# Patient Record
Sex: FEMALE | Race: WHITE | NOT HISPANIC OR LATINO
[De-identification: names, ages, dates, MRNs, and addresses within clinical notes are randomized per-mention and may not be internally consistent; named-entity substitution may affect disease eponyms.]

---

## 2017-09-30 ENCOUNTER — APPOINTMENT (OUTPATIENT)
Dept: ULTRASOUND IMAGING | Facility: CLINIC | Age: 42
End: 2017-09-30
Payer: COMMERCIAL

## 2017-09-30 ENCOUNTER — OUTPATIENT (OUTPATIENT)
Dept: OUTPATIENT SERVICES | Facility: HOSPITAL | Age: 42
LOS: 1 days | End: 2017-09-30
Payer: COMMERCIAL

## 2017-09-30 DIAGNOSIS — K82.8 OTHER SPECIFIED DISEASES OF GALLBLADDER: ICD-10-CM

## 2017-09-30 PROCEDURE — 76700 US EXAM ABDOM COMPLETE: CPT

## 2017-09-30 PROCEDURE — 76700 US EXAM ABDOM COMPLETE: CPT | Mod: 26

## 2017-10-09 ENCOUNTER — APPOINTMENT (OUTPATIENT)
Dept: OBGYN | Facility: CLINIC | Age: 42
End: 2017-10-09
Payer: COMMERCIAL

## 2017-10-09 PROCEDURE — 99396 PREV VISIT EST AGE 40-64: CPT

## 2017-12-28 ENCOUNTER — APPOINTMENT (OUTPATIENT)
Dept: ULTRASOUND IMAGING | Facility: CLINIC | Age: 42
End: 2017-12-28

## 2017-12-28 ENCOUNTER — OUTPATIENT (OUTPATIENT)
Dept: OUTPATIENT SERVICES | Facility: HOSPITAL | Age: 42
LOS: 1 days | End: 2017-12-28
Payer: COMMERCIAL

## 2017-12-28 ENCOUNTER — APPOINTMENT (OUTPATIENT)
Dept: MAMMOGRAPHY | Facility: CLINIC | Age: 42
End: 2017-12-28

## 2017-12-28 DIAGNOSIS — Z00.8 ENCOUNTER FOR OTHER GENERAL EXAMINATION: ICD-10-CM

## 2017-12-28 PROCEDURE — G0202: CPT | Mod: 26

## 2017-12-28 PROCEDURE — 77063 BREAST TOMOSYNTHESIS BI: CPT | Mod: 26

## 2017-12-28 PROCEDURE — 77067 SCR MAMMO BI INCL CAD: CPT

## 2017-12-28 PROCEDURE — 77063 BREAST TOMOSYNTHESIS BI: CPT

## 2018-05-01 ENCOUNTER — EMERGENCY (EMERGENCY)
Facility: HOSPITAL | Age: 43
LOS: 1 days | Discharge: HOME | End: 2018-05-01
Attending: EMERGENCY MEDICINE | Admitting: EMERGENCY MEDICINE
Payer: COMMERCIAL

## 2018-05-01 VITALS
DIASTOLIC BLOOD PRESSURE: 64 MMHG | RESPIRATION RATE: 16 BRPM | SYSTOLIC BLOOD PRESSURE: 119 MMHG | HEART RATE: 75 BPM | OXYGEN SATURATION: 100 % | TEMPERATURE: 99 F

## 2018-05-01 VITALS
OXYGEN SATURATION: 99 % | HEART RATE: 87 BPM | TEMPERATURE: 100 F | SYSTOLIC BLOOD PRESSURE: 112 MMHG | RESPIRATION RATE: 17 BRPM | DIASTOLIC BLOOD PRESSURE: 73 MMHG

## 2018-05-01 LAB
ALBUMIN SERPL ELPH-MCNC: 4.5 G/DL — SIGNIFICANT CHANGE UP (ref 3.3–5)
ALP SERPL-CCNC: 41 U/L — SIGNIFICANT CHANGE UP (ref 40–120)
ALT FLD-CCNC: 15 U/L — SIGNIFICANT CHANGE UP (ref 10–45)
ANION GAP SERPL CALC-SCNC: 12 MMOL/L — SIGNIFICANT CHANGE UP (ref 5–17)
APPEARANCE UR: CLEAR — SIGNIFICANT CHANGE UP
AST SERPL-CCNC: 20 U/L — SIGNIFICANT CHANGE UP (ref 10–40)
BACTERIA # UR AUTO: ABNORMAL /HPF
BASOPHILS # BLD AUTO: 0 K/UL — SIGNIFICANT CHANGE UP (ref 0–0.2)
BASOPHILS NFR BLD AUTO: 0.4 % — SIGNIFICANT CHANGE UP (ref 0–2)
BILIRUB SERPL-MCNC: 0.5 MG/DL — SIGNIFICANT CHANGE UP (ref 0.2–1.2)
BILIRUB UR-MCNC: NEGATIVE — SIGNIFICANT CHANGE UP
BUN SERPL-MCNC: 14 MG/DL — SIGNIFICANT CHANGE UP (ref 7–23)
CALCIUM SERPL-MCNC: 9.8 MG/DL — SIGNIFICANT CHANGE UP (ref 8.4–10.5)
CHLORIDE SERPL-SCNC: 102 MMOL/L — SIGNIFICANT CHANGE UP (ref 96–108)
CO2 SERPL-SCNC: 26 MMOL/L — SIGNIFICANT CHANGE UP (ref 22–31)
COLOR SPEC: SIGNIFICANT CHANGE UP
CREAT SERPL-MCNC: 0.9 MG/DL — SIGNIFICANT CHANGE UP (ref 0.5–1.3)
DIFF PNL FLD: NEGATIVE — SIGNIFICANT CHANGE UP
EOSINOPHIL # BLD AUTO: 0.1 K/UL — SIGNIFICANT CHANGE UP (ref 0–0.5)
EOSINOPHIL NFR BLD AUTO: 0.7 % — SIGNIFICANT CHANGE UP (ref 0–6)
EPI CELLS # UR: SIGNIFICANT CHANGE UP /HPF
GLUCOSE SERPL-MCNC: 91 MG/DL — SIGNIFICANT CHANGE UP (ref 70–99)
GLUCOSE UR QL: NEGATIVE — SIGNIFICANT CHANGE UP
HCT VFR BLD CALC: 38.9 % — SIGNIFICANT CHANGE UP (ref 34.5–45)
HGB BLD-MCNC: 13.7 G/DL — SIGNIFICANT CHANGE UP (ref 11.5–15.5)
HIV 1 & 2 AB SERPL IA.RAPID: SIGNIFICANT CHANGE UP
KETONES UR-MCNC: NEGATIVE — SIGNIFICANT CHANGE UP
LEUKOCYTE ESTERASE UR-ACNC: ABNORMAL
LYMPHOCYTES # BLD AUTO: 1.9 K/UL — SIGNIFICANT CHANGE UP (ref 1–3.3)
LYMPHOCYTES # BLD AUTO: 19.8 % — SIGNIFICANT CHANGE UP (ref 13–44)
MCHC RBC-ENTMCNC: 32.3 PG — SIGNIFICANT CHANGE UP (ref 27–34)
MCHC RBC-ENTMCNC: 35.1 GM/DL — SIGNIFICANT CHANGE UP (ref 32–36)
MCV RBC AUTO: 91.9 FL — SIGNIFICANT CHANGE UP (ref 80–100)
MONOCYTES # BLD AUTO: 0.6 K/UL — SIGNIFICANT CHANGE UP (ref 0–0.9)
MONOCYTES NFR BLD AUTO: 6.7 % — SIGNIFICANT CHANGE UP (ref 2–14)
NEUTROPHILS # BLD AUTO: 6.8 K/UL — SIGNIFICANT CHANGE UP (ref 1.8–7.4)
NEUTROPHILS NFR BLD AUTO: 72.4 % — SIGNIFICANT CHANGE UP (ref 43–77)
NITRITE UR-MCNC: NEGATIVE — SIGNIFICANT CHANGE UP
PH UR: 7 — SIGNIFICANT CHANGE UP (ref 5–8)
PLATELET # BLD AUTO: 166 K/UL — SIGNIFICANT CHANGE UP (ref 150–400)
POTASSIUM SERPL-MCNC: 3.8 MMOL/L — SIGNIFICANT CHANGE UP (ref 3.5–5.3)
POTASSIUM SERPL-SCNC: 3.8 MMOL/L — SIGNIFICANT CHANGE UP (ref 3.5–5.3)
PROT SERPL-MCNC: 7.5 G/DL — SIGNIFICANT CHANGE UP (ref 6–8.3)
PROT UR-MCNC: NEGATIVE — SIGNIFICANT CHANGE UP
RBC # BLD: 4.24 M/UL — SIGNIFICANT CHANGE UP (ref 3.8–5.2)
RBC # FLD: 11.3 % — SIGNIFICANT CHANGE UP (ref 10.3–14.5)
SODIUM SERPL-SCNC: 140 MMOL/L — SIGNIFICANT CHANGE UP (ref 135–145)
SP GR SPEC: 1.01 — SIGNIFICANT CHANGE UP (ref 1.01–1.02)
UROBILINOGEN FLD QL: NEGATIVE — SIGNIFICANT CHANGE UP
WBC # BLD: 9.4 K/UL — SIGNIFICANT CHANGE UP (ref 3.8–10.5)
WBC # FLD AUTO: 9.4 K/UL — SIGNIFICANT CHANGE UP (ref 3.8–10.5)
WBC UR QL: SIGNIFICANT CHANGE UP /HPF (ref 0–5)

## 2018-05-01 PROCEDURE — 86703 HIV-1/HIV-2 1 RESULT ANTBDY: CPT

## 2018-05-01 PROCEDURE — 93005 ELECTROCARDIOGRAM TRACING: CPT

## 2018-05-01 PROCEDURE — 93010 ELECTROCARDIOGRAM REPORT: CPT

## 2018-05-01 PROCEDURE — 80053 COMPREHEN METABOLIC PANEL: CPT

## 2018-05-01 PROCEDURE — 99283 EMERGENCY DEPT VISIT LOW MDM: CPT

## 2018-05-01 PROCEDURE — 99284 EMERGENCY DEPT VISIT MOD MDM: CPT | Mod: 25

## 2018-05-01 PROCEDURE — 85027 COMPLETE CBC AUTOMATED: CPT

## 2018-05-01 PROCEDURE — 81001 URINALYSIS AUTO W/SCOPE: CPT

## 2018-05-01 RX ORDER — METOCLOPRAMIDE HCL 10 MG
1 TABLET ORAL
Qty: 18 | Refills: 0 | OUTPATIENT
Start: 2018-05-01 | End: 2018-05-03

## 2018-05-01 NOTE — ED PROVIDER NOTE - MEDICAL DECISION MAKING DETAILS
Episodic vertigo, worse with head movement, walking appears consistent with benign positional vertigo.  Low suspicion for CVA as vertigo has now resolved and neuro exam is benign.  Unclear how much mitral valve prolapse increases risk of CVA.  Plan for CBC, CMP, UA.  May consider head imaging. Episodic vertigo, worse with head movement, walking appears consistent with benign positional vertigo.  Low suspicion for CVA as vertigo has now resolved and neuro exam is benign and mitral valve does not increase stroke risk.  Plan for CBC, CMP, EKG.  Will check UA given urinary frequency.  Likely d/c to home with PRN meds.

## 2018-05-01 NOTE — ED ADULT NURSE NOTE - OBJECTIVE STATEMENT
41 y/o female A&Ox4, PMH mitral valve prolapse, presents to ED sent from Urgent Care center for r/o mini stroke, vertigo symptoms. Pt states she was eating lunch at work when she became dizzy and lightheaded, states she "felt a jolt and the room spinning." Pt states she finished her work day, went to Urgent Care where they recommended pt come to ED for further evaluation to r/o stroke. Upon further assessment, airway patent and intact, denies chest pain/SOB. ABD soft nontender, denies n/v/d. Skin intact. Peripheral pulses strong and normal baseline sensation present x4. Safety and comfort measures maintained.

## 2018-05-01 NOTE — ED PROVIDER NOTE - NEUROLOGICAL, MLM
Alert and oriented, no focal deficits, no motor or sensory deficits.  No gait instability or nystagmus with positional change.

## 2018-05-01 NOTE — ED PROVIDER NOTE - OBJECTIVE STATEMENT
42F hx mitral valve prolapse presenting with episodes of dizziness.  Pt reports an episode of 'room spinning' and feel as if she was going to fall down earlier today at work.  The episode self-resolved but reoccurred multiple times later on today especially when she was walking or turning her head. 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  She follows with a cardiologist for mitral valve prolapse, has not had any valve replacement.  She denies fever., chills, headache, visual changes, focal weakness. After having the initial episode, she report some transient upper extremity paresthesias but reports that she had been researching symptoms of stroke on the internet.   Pt also endorsing some urinary frequency but has been drinking a lot of water today to 'prevent dehydration'.  She was advised to go to urgent care or ER by school RN (pt works as HS teacher) and she went to [facility] where she was advised to go to ER for stroke evaluation. 42F hx mitral valve prolapse presenting with episodes of dizziness.  Pt reports an episode of 'room spinning' and feel as if she was going to fall down earlier today at work.  The episode self-resolved but reoccurred multiple times later on today especially when she was walking or turning her head. 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  She follows with a cardiologist for mitral valve prolapse, has not had any valve replacement.  She denies fever, chills, headache, visual changes, focal weakness. After having the initial episode, she report some transient upper extremity paresthesias but reports that she had been researching symptoms of stroke on the internet.   Pt also endorsing some urinary frequency but has been drinking a lot of water today to 'prevent dehydration'.  She was advised to go to urgent care or ER by school RN (pt works as HS teacher) and she went to [facility] where she was advised to go to ER for stroke evaluation. 42F hx mitral valve prolapse presenting with episodes of dizziness.  Pt reports an episode of 'room spinning' and feel as if she was going to fall down earlier today at work.  The episode self-resolved but reoccurred multiple times later on today especially when she was walking or turning her head. 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  She follows with a cardiologist for mitral valve prolapse and has not had any valve replacement.  She denies fever, chills, headache, visual changes, focal weakness. After having the initial episode, she report some transient upper extremity paresthesias but reports that she had been researching symptoms of stroke on the internet.   Pt also endorsing some urinary frequency but has been drinking a lot of water today to 'prevent dehydration'.  She was advised to go to urgent care or ER by school RN (pt works as HS teacher) and she went to [facility] where she was advised to go to ER for stroke evaluation.

## 2018-05-01 NOTE — ED PROVIDER NOTE - ATTENDING CONTRIBUTION TO CARE
I have seen and evaluated this patient with the resident.   I agree with the findings  unless other wise stated.  I have made appropriate changes in documentations where needed, After my face to face bedside evaluation, I am further  noting:  Pt with episodic vertigo, worse with head movement, walking appears consistent with benign positional vertigo. No risk factirs for CVA  no head trauma  Low suspicion for CVA as vertigo has now resolved and neuro exam is benign and mitral valve does not increase stroke risk.  Plan for CBC, CMP, EKG all reviewed wnl .  Will check UA given urinary frequency.  Likely d/c to home with  Reglan for  needs -- [place]

## 2019-07-19 PROBLEM — I34.1 NONRHEUMATIC MITRAL (VALVE) PROLAPSE: Chronic | Status: ACTIVE | Noted: 2018-05-01

## 2019-07-22 ENCOUNTER — RESULT REVIEW (OUTPATIENT)
Age: 44
End: 2019-07-22

## 2019-07-22 ENCOUNTER — APPOINTMENT (OUTPATIENT)
Dept: OBGYN | Facility: CLINIC | Age: 44
End: 2019-07-22
Payer: COMMERCIAL

## 2019-07-22 PROCEDURE — 99396 PREV VISIT EST AGE 40-64: CPT

## 2019-07-22 PROCEDURE — 99213 OFFICE O/P EST LOW 20 MIN: CPT | Mod: 25

## 2019-08-13 ENCOUNTER — OUTPATIENT (OUTPATIENT)
Dept: OUTPATIENT SERVICES | Facility: HOSPITAL | Age: 44
LOS: 1 days | End: 2019-08-13
Payer: COMMERCIAL

## 2019-08-13 ENCOUNTER — APPOINTMENT (OUTPATIENT)
Dept: MAMMOGRAPHY | Facility: CLINIC | Age: 44
End: 2019-08-13
Payer: COMMERCIAL

## 2019-08-13 DIAGNOSIS — Z00.8 ENCOUNTER FOR OTHER GENERAL EXAMINATION: ICD-10-CM

## 2019-08-13 PROCEDURE — 77063 BREAST TOMOSYNTHESIS BI: CPT

## 2019-08-13 PROCEDURE — 77067 SCR MAMMO BI INCL CAD: CPT

## 2019-08-13 PROCEDURE — 77063 BREAST TOMOSYNTHESIS BI: CPT | Mod: 26

## 2019-08-13 PROCEDURE — 77067 SCR MAMMO BI INCL CAD: CPT | Mod: 26

## 2019-08-14 ENCOUNTER — APPOINTMENT (OUTPATIENT)
Dept: OBGYN | Facility: CLINIC | Age: 44
End: 2019-08-14
Payer: COMMERCIAL

## 2019-08-14 PROCEDURE — 99213 OFFICE O/P EST LOW 20 MIN: CPT

## 2019-09-18 ENCOUNTER — APPOINTMENT (OUTPATIENT)
Dept: OBGYN | Facility: CLINIC | Age: 44
End: 2019-09-18
Payer: COMMERCIAL

## 2019-09-18 PROCEDURE — 99213 OFFICE O/P EST LOW 20 MIN: CPT

## 2019-10-09 ENCOUNTER — OUTPATIENT (OUTPATIENT)
Dept: OUTPATIENT SERVICES | Facility: HOSPITAL | Age: 44
LOS: 1 days | End: 2019-10-09
Payer: COMMERCIAL

## 2019-10-09 ENCOUNTER — APPOINTMENT (OUTPATIENT)
Dept: ULTRASOUND IMAGING | Facility: CLINIC | Age: 44
End: 2019-10-09
Payer: COMMERCIAL

## 2019-10-09 DIAGNOSIS — Z00.8 ENCOUNTER FOR OTHER GENERAL EXAMINATION: ICD-10-CM

## 2019-10-09 PROCEDURE — 76641 ULTRASOUND BREAST COMPLETE: CPT | Mod: 26,50

## 2019-10-09 PROCEDURE — 76856 US EXAM PELVIC COMPLETE: CPT | Mod: 26,59

## 2019-10-09 PROCEDURE — 76856 US EXAM PELVIC COMPLETE: CPT

## 2019-10-09 PROCEDURE — 76830 TRANSVAGINAL US NON-OB: CPT | Mod: 26

## 2019-10-09 PROCEDURE — 76830 TRANSVAGINAL US NON-OB: CPT

## 2019-10-09 PROCEDURE — 76641 ULTRASOUND BREAST COMPLETE: CPT

## 2019-10-11 ENCOUNTER — APPOINTMENT (OUTPATIENT)
Dept: ULTRASOUND IMAGING | Facility: CLINIC | Age: 44
End: 2019-10-11
Payer: COMMERCIAL

## 2019-10-11 ENCOUNTER — RESULT REVIEW (OUTPATIENT)
Age: 44
End: 2019-10-11

## 2019-10-11 ENCOUNTER — OUTPATIENT (OUTPATIENT)
Dept: OUTPATIENT SERVICES | Facility: HOSPITAL | Age: 44
LOS: 1 days | End: 2019-10-11
Payer: COMMERCIAL

## 2019-10-11 DIAGNOSIS — Z00.8 ENCOUNTER FOR OTHER GENERAL EXAMINATION: ICD-10-CM

## 2019-10-11 PROCEDURE — 88305 TISSUE EXAM BY PATHOLOGIST: CPT | Mod: 26

## 2019-10-11 PROCEDURE — 77065 DX MAMMO INCL CAD UNI: CPT | Mod: 26,LT

## 2019-10-11 PROCEDURE — 88305 TISSUE EXAM BY PATHOLOGIST: CPT

## 2019-10-11 PROCEDURE — A4648: CPT

## 2019-10-11 PROCEDURE — 77065 DX MAMMO INCL CAD UNI: CPT

## 2019-10-11 PROCEDURE — 19083 BX BREAST 1ST LESION US IMAG: CPT | Mod: LT

## 2019-10-11 PROCEDURE — 19083 BX BREAST 1ST LESION US IMAG: CPT

## 2021-03-09 ENCOUNTER — OUTPATIENT (OUTPATIENT)
Dept: OUTPATIENT SERVICES | Facility: HOSPITAL | Age: 46
LOS: 1 days | End: 2021-03-09
Payer: COMMERCIAL

## 2021-03-09 ENCOUNTER — APPOINTMENT (OUTPATIENT)
Dept: MAMMOGRAPHY | Facility: CLINIC | Age: 46
End: 2021-03-09
Payer: COMMERCIAL

## 2021-03-09 ENCOUNTER — APPOINTMENT (OUTPATIENT)
Dept: ULTRASOUND IMAGING | Facility: CLINIC | Age: 46
End: 2021-03-09
Payer: COMMERCIAL

## 2021-03-09 DIAGNOSIS — Z00.00 ENCOUNTER FOR GENERAL ADULT MEDICAL EXAMINATION WITHOUT ABNORMAL FINDINGS: ICD-10-CM

## 2021-03-09 DIAGNOSIS — N60.19 DIFFUSE CYSTIC MASTOPATHY OF UNSPECIFIED BREAST: ICD-10-CM

## 2021-03-09 DIAGNOSIS — Z12.39 ENCOUNTER FOR OTHER SCREENING FOR MALIGNANT NEOPLASM OF BREAST: ICD-10-CM

## 2021-03-09 DIAGNOSIS — Z00.8 ENCOUNTER FOR OTHER GENERAL EXAMINATION: ICD-10-CM

## 2021-03-09 PROCEDURE — 77067 SCR MAMMO BI INCL CAD: CPT

## 2021-03-09 PROCEDURE — 77067 SCR MAMMO BI INCL CAD: CPT | Mod: 26

## 2021-03-09 PROCEDURE — 77063 BREAST TOMOSYNTHESIS BI: CPT | Mod: 26

## 2021-03-09 PROCEDURE — 77063 BREAST TOMOSYNTHESIS BI: CPT

## 2021-03-09 PROCEDURE — 76641 ULTRASOUND BREAST COMPLETE: CPT

## 2021-03-09 PROCEDURE — 76641 ULTRASOUND BREAST COMPLETE: CPT | Mod: 26,50

## 2021-08-21 NOTE — ED ADULT NURSE NOTE - TEMPLATE LIST FOR HEAD TO TOE ASSESSMENT
Neuro
Alert-The patient is alert, awake and responds to voice. The patient is oriented to time, place, and person. The triage nurse is able to obtain subjective information.

## 2022-06-10 ENCOUNTER — APPOINTMENT (OUTPATIENT)
Dept: ULTRASOUND IMAGING | Facility: CLINIC | Age: 47
End: 2022-06-10
Payer: COMMERCIAL

## 2022-06-10 ENCOUNTER — APPOINTMENT (OUTPATIENT)
Dept: MAMMOGRAPHY | Facility: CLINIC | Age: 47
End: 2022-06-10
Payer: COMMERCIAL

## 2022-06-10 ENCOUNTER — OUTPATIENT (OUTPATIENT)
Dept: OUTPATIENT SERVICES | Facility: HOSPITAL | Age: 47
LOS: 1 days | End: 2022-06-10
Payer: COMMERCIAL

## 2022-06-10 DIAGNOSIS — Z00.8 ENCOUNTER FOR OTHER GENERAL EXAMINATION: ICD-10-CM

## 2022-06-10 PROCEDURE — 77067 SCR MAMMO BI INCL CAD: CPT | Mod: 26

## 2022-06-10 PROCEDURE — 77063 BREAST TOMOSYNTHESIS BI: CPT | Mod: 26

## 2022-06-10 PROCEDURE — 76641 ULTRASOUND BREAST COMPLETE: CPT | Mod: 26,50

## 2022-06-10 PROCEDURE — 77067 SCR MAMMO BI INCL CAD: CPT

## 2022-06-10 PROCEDURE — 77063 BREAST TOMOSYNTHESIS BI: CPT

## 2022-06-10 PROCEDURE — 76641 ULTRASOUND BREAST COMPLETE: CPT

## 2023-01-06 ENCOUNTER — EMERGENCY (EMERGENCY)
Facility: HOSPITAL | Age: 48
LOS: 1 days | Discharge: HOME | End: 2023-01-06
Attending: EMERGENCY MEDICINE | Admitting: EMERGENCY MEDICINE
Payer: COMMERCIAL

## 2023-01-06 VITALS
HEART RATE: 83 BPM | TEMPERATURE: 98 F | DIASTOLIC BLOOD PRESSURE: 79 MMHG | OXYGEN SATURATION: 100 % | SYSTOLIC BLOOD PRESSURE: 128 MMHG | RESPIRATION RATE: 18 BRPM

## 2023-01-06 LAB
ALBUMIN SERPL ELPH-MCNC: 5.3 G/DL — HIGH (ref 3.3–5)
ALP SERPL-CCNC: 54 U/L — SIGNIFICANT CHANGE UP (ref 40–120)
ALT FLD-CCNC: 14 U/L — SIGNIFICANT CHANGE UP (ref 4–33)
ANION GAP SERPL CALC-SCNC: 11 MMOL/L — SIGNIFICANT CHANGE UP (ref 7–14)
APPEARANCE UR: CLEAR — SIGNIFICANT CHANGE UP
AST SERPL-CCNC: 19 U/L — SIGNIFICANT CHANGE UP (ref 4–32)
BASOPHILS # BLD AUTO: 0.03 K/UL — SIGNIFICANT CHANGE UP (ref 0–0.2)
BASOPHILS NFR BLD AUTO: 0.3 % — SIGNIFICANT CHANGE UP (ref 0–2)
BILIRUB SERPL-MCNC: 0.7 MG/DL — SIGNIFICANT CHANGE UP (ref 0.2–1.2)
BILIRUB UR-MCNC: NEGATIVE — SIGNIFICANT CHANGE UP
BLD GP AB SCN SERPL QL: NEGATIVE — SIGNIFICANT CHANGE UP
BUN SERPL-MCNC: 9 MG/DL — SIGNIFICANT CHANGE UP (ref 7–23)
CALCIUM SERPL-MCNC: 9.6 MG/DL — SIGNIFICANT CHANGE UP (ref 8.4–10.5)
CHLORIDE SERPL-SCNC: 101 MMOL/L — SIGNIFICANT CHANGE UP (ref 98–107)
CO2 SERPL-SCNC: 27 MMOL/L — SIGNIFICANT CHANGE UP (ref 22–31)
COLOR SPEC: COLORLESS — SIGNIFICANT CHANGE UP
CREAT SERPL-MCNC: 0.77 MG/DL — SIGNIFICANT CHANGE UP (ref 0.5–1.3)
DIFF PNL FLD: NEGATIVE — SIGNIFICANT CHANGE UP
EGFR: 96 ML/MIN/1.73M2 — SIGNIFICANT CHANGE UP
EOSINOPHIL # BLD AUTO: 0.04 K/UL — SIGNIFICANT CHANGE UP (ref 0–0.5)
EOSINOPHIL NFR BLD AUTO: 0.4 % — SIGNIFICANT CHANGE UP (ref 0–6)
FLUAV AG NPH QL: SIGNIFICANT CHANGE UP
FLUBV AG NPH QL: SIGNIFICANT CHANGE UP
GLUCOSE SERPL-MCNC: 91 MG/DL — SIGNIFICANT CHANGE UP (ref 70–99)
GLUCOSE UR QL: NEGATIVE — SIGNIFICANT CHANGE UP
HCG SERPL-ACNC: <5 MIU/ML — SIGNIFICANT CHANGE UP
HCT VFR BLD CALC: 40.5 % — SIGNIFICANT CHANGE UP (ref 34.5–45)
HGB BLD-MCNC: 13.4 G/DL — SIGNIFICANT CHANGE UP (ref 11.5–15.5)
IANC: 8.1 K/UL — HIGH (ref 1.8–7.4)
IMM GRANULOCYTES NFR BLD AUTO: 0.3 % — SIGNIFICANT CHANGE UP (ref 0–0.9)
KETONES UR-MCNC: NEGATIVE — SIGNIFICANT CHANGE UP
LEUKOCYTE ESTERASE UR-ACNC: NEGATIVE — SIGNIFICANT CHANGE UP
LYMPHOCYTES # BLD AUTO: 18.5 % — SIGNIFICANT CHANGE UP (ref 13–44)
LYMPHOCYTES # BLD AUTO: 2.02 K/UL — SIGNIFICANT CHANGE UP (ref 1–3.3)
MCHC RBC-ENTMCNC: 29.8 PG — SIGNIFICANT CHANGE UP (ref 27–34)
MCHC RBC-ENTMCNC: 33.1 GM/DL — SIGNIFICANT CHANGE UP (ref 32–36)
MCV RBC AUTO: 90 FL — SIGNIFICANT CHANGE UP (ref 80–100)
MONOCYTES # BLD AUTO: 0.67 K/UL — SIGNIFICANT CHANGE UP (ref 0–0.9)
MONOCYTES NFR BLD AUTO: 6.2 % — SIGNIFICANT CHANGE UP (ref 2–14)
NEUTROPHILS # BLD AUTO: 8.1 K/UL — HIGH (ref 1.8–7.4)
NEUTROPHILS NFR BLD AUTO: 74.3 % — SIGNIFICANT CHANGE UP (ref 43–77)
NITRITE UR-MCNC: NEGATIVE — SIGNIFICANT CHANGE UP
NRBC # BLD: 0 /100 WBCS — SIGNIFICANT CHANGE UP (ref 0–0)
NRBC # FLD: 0 K/UL — SIGNIFICANT CHANGE UP (ref 0–0)
PH UR: 6.5 — SIGNIFICANT CHANGE UP (ref 5–8)
PLATELET # BLD AUTO: 208 K/UL — SIGNIFICANT CHANGE UP (ref 150–400)
POTASSIUM SERPL-MCNC: 3.9 MMOL/L — SIGNIFICANT CHANGE UP (ref 3.5–5.3)
POTASSIUM SERPL-SCNC: 3.9 MMOL/L — SIGNIFICANT CHANGE UP (ref 3.5–5.3)
PROT SERPL-MCNC: 8.2 G/DL — SIGNIFICANT CHANGE UP (ref 6–8.3)
PROT UR-MCNC: NEGATIVE — SIGNIFICANT CHANGE UP
RBC # BLD: 4.5 M/UL — SIGNIFICANT CHANGE UP (ref 3.8–5.2)
RBC # FLD: 12.9 % — SIGNIFICANT CHANGE UP (ref 10.3–14.5)
RH IG SCN BLD-IMP: POSITIVE — SIGNIFICANT CHANGE UP
RSV RNA NPH QL NAA+NON-PROBE: SIGNIFICANT CHANGE UP
SARS-COV-2 RNA SPEC QL NAA+PROBE: SIGNIFICANT CHANGE UP
SODIUM SERPL-SCNC: 139 MMOL/L — SIGNIFICANT CHANGE UP (ref 135–145)
SP GR SPEC: 1.01 — SIGNIFICANT CHANGE UP (ref 1.01–1.05)
TROPONIN T, HIGH SENSITIVITY RESULT: <6 NG/L — SIGNIFICANT CHANGE UP
TSH SERPL-MCNC: 2.73 UIU/ML — SIGNIFICANT CHANGE UP (ref 0.27–4.2)
UROBILINOGEN FLD QL: SIGNIFICANT CHANGE UP
WBC # BLD: 10.89 K/UL — HIGH (ref 3.8–10.5)
WBC # FLD AUTO: 10.89 K/UL — HIGH (ref 3.8–10.5)

## 2023-01-06 PROCEDURE — 99285 EMERGENCY DEPT VISIT HI MDM: CPT

## 2023-01-06 PROCEDURE — 70498 CT ANGIOGRAPHY NECK: CPT | Mod: 26,MA

## 2023-01-06 PROCEDURE — 71046 X-RAY EXAM CHEST 2 VIEWS: CPT | Mod: 26

## 2023-01-06 PROCEDURE — 70496 CT ANGIOGRAPHY HEAD: CPT | Mod: 26,MA

## 2023-01-06 RX ORDER — MECLIZINE HCL 12.5 MG
12.5 TABLET ORAL ONCE
Refills: 0 | Status: DISCONTINUED | OUTPATIENT
Start: 2023-01-06 | End: 2023-01-06

## 2023-01-06 RX ORDER — KETOROLAC TROMETHAMINE 30 MG/ML
15 SYRINGE (ML) INJECTION ONCE
Refills: 0 | Status: DISCONTINUED | OUTPATIENT
Start: 2023-01-06 | End: 2023-01-06

## 2023-01-06 RX ORDER — MECLIZINE HCL 12.5 MG
25 TABLET ORAL ONCE
Refills: 0 | Status: COMPLETED | OUTPATIENT
Start: 2023-01-06 | End: 2023-01-06

## 2023-01-06 RX ADMIN — Medication 25 MILLIGRAM(S): at 21:17

## 2023-01-06 RX ADMIN — Medication 15 MILLIGRAM(S): at 22:07

## 2023-01-06 RX ADMIN — Medication 15 MILLIGRAM(S): at 22:22

## 2023-01-06 NOTE — ED PROVIDER NOTE - CLINICAL SUMMARY MEDICAL DECISION MAKING FREE TEXT BOX
47-year-old female with no significant past medical history presenting with a complaint of palpitations associated with dizziness and sensation of imbalance x1 week.  On presentation patient is well-appearing, vital stable, EKG without any ischemic changes or arrhythmias.  Physical exam is unremarkable, with normal neuro exam.  Symptoms possibly due to dizziness secondary to vertigo versus central etiology, anemia, electrolyte derangements.  Plan for routine labs including TSH troponin, chest x-ray, CT angio head and neck.

## 2023-01-06 NOTE — ED PROVIDER NOTE - ATTENDING APP SHARED VISIT CONTRIBUTION OF CARE
Patient is a 47-year-old female with no significant past medical history presenting with complaint of palpitations associated with chest discomfort, dizziness and sensation of imbalance x1 week.  She reports feeling of veering to the side with walking, usually provoked upon standing from seated position or with position changes.  She is also noted to experience bilateral neck pain.  She denies recent injury, trauma, URI-like symptoms, viral syndrome, recent nausea/vomiting/diarrhea, heavy menses prior to the onset of symptoms.  Patient states that she was evaluated by ENT specialist with a normal work-up and subsequently was referred to neurology for further evaluation.

## 2023-01-06 NOTE — ED ADULT TRIAGE NOTE - CHIEF COMPLAINT QUOTE
Pt c/o palpitations and chest discomfort today. c/o feeling off balance x1 week and weakness to both legs. c/o lightheadedness in the morning. Denies PMH.

## 2023-01-06 NOTE — ED ADULT NURSE NOTE - OBJECTIVE STATEMENT
patient aaox4. ambulatory. came in with lightheadedness and dizziness x2 weeks. patient reports when it gets warm she feels lightheaded and off balance. patient denies room spinning. has bilateral ear ache. went to ent who recommended neuro and mri. also endorses bilateral neck pain and headache. hx of mitral valve prolapse with appt with cards 1/10.  iv started to right ac #20g. labs drawn and sent. Will continue to monitor

## 2023-01-07 VITALS
SYSTOLIC BLOOD PRESSURE: 111 MMHG | RESPIRATION RATE: 18 BRPM | TEMPERATURE: 98 F | HEART RATE: 71 BPM | DIASTOLIC BLOOD PRESSURE: 56 MMHG

## 2023-01-07 PROCEDURE — 70544 MR ANGIOGRAPHY HEAD W/O DYE: CPT | Mod: 26,59

## 2023-01-07 PROCEDURE — G1004: CPT

## 2023-01-07 PROCEDURE — 99236 HOSP IP/OBS SAME DATE HI 85: CPT

## 2023-01-07 PROCEDURE — 70551 MRI BRAIN STEM W/O DYE: CPT | Mod: 26

## 2023-01-07 PROCEDURE — 93306 TTE W/DOPPLER COMPLETE: CPT | Mod: 26

## 2023-01-07 PROCEDURE — 93971 EXTREMITY STUDY: CPT | Mod: 26,RT

## 2023-01-07 RX ORDER — DIAZEPAM 5 MG
1 TABLET ORAL
Qty: 8 | Refills: 0
Start: 2023-01-07

## 2023-01-07 RX ORDER — MECLIZINE HCL 12.5 MG
1 TABLET ORAL
Qty: 21 | Refills: 0
Start: 2023-01-07

## 2023-01-07 RX ORDER — MECLIZINE HCL 12.5 MG
25 TABLET ORAL EVERY 6 HOURS
Refills: 0 | Status: DISCONTINUED | OUTPATIENT
Start: 2023-01-07 | End: 2023-01-10

## 2023-01-07 NOTE — ED CDU PROVIDER INITIAL DAY NOTE - DETAILS
Tele monitoring, neuro checks, MRI/MRA head for further evaluation of CTA finding, supportive care, generalized observation care / monitoring.

## 2023-01-07 NOTE — ED CDU PROVIDER DISPOSITION NOTE - NSFOLLOWUPINSTRUCTIONS_ED_ALL_ED_FT
follow up with cardiology and neurology within 1-2 weeks.  Avoid any strenuous activity.  Return to Ed for any worsening dizziness, weakness, chest pain or any other concerning symptoms.

## 2023-01-07 NOTE — ED CDU PROVIDER INITIAL DAY NOTE - CLINICAL SUMMARY MEDICAL DECISION MAKING FREE TEXT BOX
Tele monitoring, neuro checks, MRI/MRA head for further evaluation of CTA finding, supportive care, generalized observation care / monitoring. Tele monitoring, neuro checks, MRI/MRA head for further evaluation of CTA finding, supportive care, generalized observation care / monitoring.  Echo and TDD cardiologist evaluation added to CDU plan. Tele monitoring, neuro checks, MRI/MRA head for further evaluation of CTA finding, supportive care, generalized observation care / monitoring.  Echo and TDD cardiologist evaluation added to CDU plan. stable

## 2023-01-07 NOTE — ED CDU PROVIDER INITIAL DAY NOTE - NS ED ATTENDING STATEMENT MOD
This was a shared visit with the SANDRINE. I reviewed and verified the documentation and independently performed the documented:

## 2023-01-07 NOTE — ED CDU PROVIDER INITIAL DAY NOTE - OBJECTIVE STATEMENT
Patient is a 47-year-old female with no significant past medical history presenting with complaint of palpitations associated with chest discomfort, dizziness and sensation of imbalance x1 week.  She reports feeling of veering to the side with walking, usually provoked upon standing from seated position or with position changes.  She is also noted to experience bilateral neck pain.  She denies recent injury, trauma, URI-like symptoms, viral syndrome, recent nausea/vomiting/diarrhea, heavy menses prior to the onset of symptoms.  Patient states that she was evaluated by ENT specialist with a normal work-up and subsequently was referred to neurology for further evaluation.    CDU VIN Hong Note------  ED Provider HPI as above, reviewed.  Pt is a 46 yo female, PMH mitral valve prolapse, presented to the ED c/o dizziness and imbalance of gait, with episodes of palpitations/chest discomfort when these episodes occur.  No hx/o falls, trauma, no hx/o syncope.  In the ED, VSS, pt afebrile, EKG with no acute/ischemic findings.  WBC 10.89, CMP/TSH unremarkable, trop <6, BHCG <5.  UA unremarkable.  CTA head/neck were performed: ".....IMPRESSION: CT HEAD: No acute intracranial hemorrhage or mass effect.  CTA BRAIN: Somewhat irregular marked narrowing of the V4 segment right vertebral artery after the takeoff of the PICA, cannot exclude intracranial dissection. Consider follow-up with brain MRI/MRA.  CTA NECK: Patent cervical vasculature. No flow limiting stenosis or occlusion...".  Pt was dispo'd to CDU for continued care plan:  Tele monitoring, neuro checks, MRI/MRA head for further evaluation of CTA finding, supportive care, generalized observation care / monitoring.

## 2023-01-07 NOTE — ED CDU PROVIDER INITIAL DAY NOTE - ATTENDING APP SHARED VISIT CONTRIBUTION OF CARE
Tele monitoring, neuro checks, MRI/MRA head for further evaluation of CTA finding, supportive care, generalized observation care / monitoring.  Echo and TDD cardiologist evaluation added to CDU plan.  Patient with also right groin pain, will add dvt study

## 2023-01-07 NOTE — ED CDU PROVIDER DISPOSITION NOTE - ATTENDING CONTRIBUTION TO CARE
47 year old admitted to cdu for imbalance and dizziness. workup grossly normal. seen by cards and neuro.  mr wnl. dvt study negative. to follow up with cards and neuro.

## 2023-01-07 NOTE — ED CDU PROVIDER DISPOSITION NOTE - PATIENT PORTAL LINK FT
You can access the FollowMyHealth Patient Portal offered by [facility] by registering at the following website: [email or web address]. By joining [employer]’s FollowMyHealth portal, you will also be able to view your health information using other applications (apps) compatible with our system.

## 2023-01-07 NOTE — ED CDU PROVIDER DISPOSITION NOTE - CLINICAL COURSE
Pt is a 48 yo female, PMH mitral valve prolapse, presented to the ED c/o dizziness and imbalance of gait, with episodes of palpitations/chest discomfort when these episodes occur.  No hx/o falls, trauma, no hx/o syncope.  In the ED, VSS, pt afebrile, EKG with no acute/ischemic findings.  WBC 10.89, CMP/TSH unremarkable, trop <6, BHCG <5.  UA unremarkable.  CTA head/neck were performed: ".....IMPRESSION: CT HEAD: No acute intracranial hemorrhage or mass effect.  CTA BRAIN: Somewhat irregular marked narrowing of the V4 segment right vertebral artery after the takeoff of the PICA, cannot exclude intracranial dissection. Consider follow-up with brain MRI/MRA.  CTA NECK: Patent cervical vasculature. No flow limiting stenosis or occlusion...".  Pt was dispo'd to CDU for continued care plan:  Tele monitoring, neuro checks, MRI/MRA head for further evaluation of CTA finding, supportive care, generalized observation care / monitoring.  MRI was done which showed -No vaso-occlusive disease. 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  Pt will follow up with cardiology and neurology outpatient.

## 2023-01-11 NOTE — ED POST DISCHARGE NOTE - ADDITIONAL DOCUMENTATION
pt feels much better- appreciated the phone call and has followup with pmd, and will make one with neuro- gave [facility] referral line

## 2023-09-01 ENCOUNTER — OUTPATIENT (OUTPATIENT)
Dept: OUTPATIENT SERVICES | Facility: HOSPITAL | Age: 48
LOS: 1 days | End: 2023-09-01
Payer: COMMERCIAL

## 2023-09-01 ENCOUNTER — APPOINTMENT (OUTPATIENT)
Dept: ULTRASOUND IMAGING | Facility: CLINIC | Age: 48
End: 2023-09-01
Payer: COMMERCIAL

## 2023-09-01 ENCOUNTER — APPOINTMENT (OUTPATIENT)
Dept: MAMMOGRAPHY | Facility: CLINIC | Age: 48
End: 2023-09-01
Payer: COMMERCIAL

## 2023-09-01 DIAGNOSIS — N64.4 MASTODYNIA: ICD-10-CM

## 2023-09-01 PROCEDURE — 77067 SCR MAMMO BI INCL CAD: CPT

## 2023-09-01 PROCEDURE — 76641 ULTRASOUND BREAST COMPLETE: CPT

## 2023-09-01 PROCEDURE — 76641 ULTRASOUND BREAST COMPLETE: CPT | Mod: 26,50

## 2023-09-01 PROCEDURE — 77067 SCR MAMMO BI INCL CAD: CPT | Mod: 26

## 2023-09-01 PROCEDURE — 77063 BREAST TOMOSYNTHESIS BI: CPT

## 2023-09-01 PROCEDURE — 77063 BREAST TOMOSYNTHESIS BI: CPT | Mod: 26

## 2023-10-23 ENCOUNTER — OUTPATIENT (OUTPATIENT)
Dept: OUTPATIENT SERVICES | Facility: HOSPITAL | Age: 48
LOS: 1 days | End: 2023-10-23
Payer: COMMERCIAL

## 2023-10-23 ENCOUNTER — APPOINTMENT (OUTPATIENT)
Dept: MRI IMAGING | Facility: CLINIC | Age: 48
End: 2023-10-23
Payer: COMMERCIAL

## 2023-10-23 DIAGNOSIS — Z91.89 OTHER SPECIFIED PERSONAL RISK FACTORS, NOT ELSEWHERE CLASSIFIED: ICD-10-CM

## 2023-10-23 PROCEDURE — C8937: CPT

## 2023-10-23 PROCEDURE — C8908: CPT

## 2023-10-23 PROCEDURE — A9585: CPT

## 2023-10-23 PROCEDURE — 77049 MRI BREAST C-+ W/CAD BI: CPT | Mod: 26

## 2023-11-26 ENCOUNTER — NON-APPOINTMENT (OUTPATIENT)
Age: 48
End: 2023-11-26

## 2023-12-23 ENCOUNTER — NON-APPOINTMENT (OUTPATIENT)
Age: 48
End: 2023-12-23

## 2024-07-08 NOTE — ED PROVIDER NOTE - NS ED ATTENDING STATEMENT MOD
"SUBJECTIVE:  Hong Roy is a 44 y.o. female here alone for Hyperlipidemia and Follow-up (Patient here for 6 month follow up)      HPI Patient presents to the clinic for six month follow up. Patient states all of her medicine is working well with no side effects or problems.  She does notice more muscle aches with start-up atorvastatin to 20 mg.  She did stop medication for couple weeks and did notice improvement in muscle pains she has started taking 2 again however she did not have this with 10 mg and wanting to decrease back down to 10 mg.  She is with ongoing blood in urine she is following with urologist and does have scope plan.  Negative CT screenings and ultrasounds as well as x-rays with no urinary tract symptoms.  Tolerant vitamin-D with no issues.  Ganglion cyst resolved on thumb.  No other complaints at this time and otherwise feeling well    Keons allergies, medications, history, and problem list were updated as appropriate.    Review of Systems   Genitourinary:  Positive for hematuria.      A comprehensive review of symptoms was completed and negative except as noted above.    OBJECTIVE:  Vital signs  Vitals:    07/08/24 0853   BP: 120/60   BP Location: Right arm   Patient Position: Sitting   Pulse: 90   Resp: 20   Temp: 97.2 °F (36.2 °C)   SpO2: 100%   Weight: 76.7 kg (169 lb)   Height: 5' 8" (1.727 m)        Physical Exam  Vitals and nursing note reviewed.   Constitutional:       Appearance: Normal appearance.   HENT:      Head: Normocephalic and atraumatic.      Right Ear: Tympanic membrane, ear canal and external ear normal.      Left Ear: Tympanic membrane, ear canal and external ear normal.      Nose: Nose normal.      Mouth/Throat:      Mouth: Mucous membranes are moist.      Pharynx: Oropharynx is clear.   Eyes:      Extraocular Movements: Extraocular movements intact.      Conjunctiva/sclera: Conjunctivae normal.      Pupils: Pupils are equal, round, and reactive to light. "   Cardiovascular:      Rate and Rhythm: Normal rate and regular rhythm.      Pulses: Normal pulses.      Heart sounds: Normal heart sounds.   Pulmonary:      Effort: Pulmonary effort is normal.      Breath sounds: Normal breath sounds.   Abdominal:      General: Abdomen is flat. Bowel sounds are normal.      Palpations: Abdomen is soft.   Musculoskeletal:         General: Normal range of motion.      Cervical back: Normal range of motion.   Skin:     General: Skin is warm and dry.      Capillary Refill: Capillary refill takes less than 2 seconds.   Neurological:      General: No focal deficit present.      Mental Status: She is alert.   Psychiatric:         Mood and Affect: Mood normal.         Behavior: Behavior normal.         Thought Content: Thought content normal.         Judgment: Judgment normal.          No visits with results within 1 Day(s) from this visit.   Latest known visit with results is:   Appointment on 06/24/2024   Component Date Value Ref Range Status    Urine Culture, Routine 06/24/2024 Final report   Final    Result 1 06/24/2024 Comment   Final    No growth in 36 - 48 hours.          ASSESSMENT/PLAN:    1. Hyperlipidemia, unspecified hyperlipidemia type  Assessment & Plan:  Decrease atorvastatin to 10mg   Increase myalgia with increase to 20mg     Orders:  -     atorvastatin (LIPITOR) 10 MG tablet; Take 1 tablet (10 mg total) by mouth once daily.  Dispense: 90 tablet; Refill: 3    2. Subcutaneous nodule of left thumb  Assessment & Plan:  Resolved       3. Other microscopic hematuria  Assessment & Plan:  Following with Federico and has cytoscopy planned for this month            No results found for this or any previous visit (from the past 24 hour(s)).    Follow Up:  Follow up in about 3 months (around 10/8/2024) for Wellness.      Discussed the diagnosis, prognosis, plan of care, chronic nature of and indications for, risks and benefits of treatment for conditions.  Continue all medications as  prescribed unless otherwise noted.   Call if patient develops other symptoms or if not better in 2-3 days and sooner if worse. Emphasized the importance of compliance with all recommendations, including medication use and timely follow up. Instructed to return as noted be or sooner if patient develops any other problems or if there are any other additional questions or concerns.           This was a shared visit with the SANDRINE. I reviewed and verified the documentation and independently performed the documented:

## 2024-08-28 ENCOUNTER — OFFICE (OUTPATIENT)
Dept: URBAN - METROPOLITAN AREA CLINIC 70 | Facility: CLINIC | Age: 49
Setting detail: OPHTHALMOLOGY
End: 2024-08-28
Payer: COMMERCIAL

## 2024-08-28 DIAGNOSIS — H02.011: ICD-10-CM

## 2024-08-28 DIAGNOSIS — H00.11: ICD-10-CM

## 2024-08-28 PROCEDURE — 67820 REVISE EYELASHES: CPT | Mod: E3 | Performed by: OPHTHALMOLOGY

## 2024-08-28 PROCEDURE — 92002 INTRM OPH EXAM NEW PATIENT: CPT | Mod: 25 | Performed by: OPHTHALMOLOGY

## 2024-08-28 ASSESSMENT — CONFRONTATIONAL VISUAL FIELD TEST (CVF)
OS_FINDINGS: FULL
OD_FINDINGS: FULL

## 2024-08-28 ASSESSMENT — LID EXAM ASSESSMENTS: OD_TRICHIASIS: RUL 1+

## 2024-09-28 ENCOUNTER — OFFICE (OUTPATIENT)
Dept: URBAN - METROPOLITAN AREA CLINIC 70 | Facility: CLINIC | Age: 49
Setting detail: OPHTHALMOLOGY
End: 2024-09-28
Payer: COMMERCIAL

## 2024-09-28 DIAGNOSIS — H00.11: ICD-10-CM

## 2024-09-28 PROCEDURE — 92012 INTRM OPH EXAM EST PATIENT: CPT | Performed by: OPHTHALMOLOGY

## 2024-09-28 ASSESSMENT — CONFRONTATIONAL VISUAL FIELD TEST (CVF)
OS_FINDINGS: FULL
OD_FINDINGS: FULL

## 2025-02-04 ENCOUNTER — EMERGENCY (EMERGENCY)
Facility: HOSPITAL | Age: 50
LOS: 1 days | Discharge: HOME | End: 2025-02-04
Attending: EMERGENCY MEDICINE | Admitting: EMERGENCY MEDICINE
Payer: COMMERCIAL

## 2025-02-04 VITALS
HEIGHT: 67 IN | DIASTOLIC BLOOD PRESSURE: 88 MMHG | TEMPERATURE: 99 F | RESPIRATION RATE: 14 BRPM | HEART RATE: 72 BPM | SYSTOLIC BLOOD PRESSURE: 133 MMHG | WEIGHT: 169.98 LBS | OXYGEN SATURATION: 100 %

## 2025-02-04 VITALS
RESPIRATION RATE: 16 BRPM | TEMPERATURE: 99 F | SYSTOLIC BLOOD PRESSURE: 130 MMHG | DIASTOLIC BLOOD PRESSURE: 77 MMHG | OXYGEN SATURATION: 98 % | HEART RATE: 80 BPM

## 2025-02-04 LAB — HCG UR QL: NEGATIVE — SIGNIFICANT CHANGE UP

## 2025-02-04 PROCEDURE — 72040 X-RAY EXAM NECK SPINE 2-3 VW: CPT

## 2025-02-04 PROCEDURE — 72100 X-RAY EXAM L-S SPINE 2/3 VWS: CPT

## 2025-02-04 PROCEDURE — 71046 X-RAY EXAM CHEST 2 VIEWS: CPT

## 2025-02-04 PROCEDURE — 81025 URINE PREGNANCY TEST: CPT

## 2025-02-04 PROCEDURE — 99284 EMERGENCY DEPT VISIT MOD MDM: CPT

## 2025-02-04 PROCEDURE — 71046 X-RAY EXAM CHEST 2 VIEWS: CPT | Mod: 26

## 2025-02-04 PROCEDURE — 72100 X-RAY EXAM L-S SPINE 2/3 VWS: CPT | Mod: 26

## 2025-02-04 PROCEDURE — 72040 X-RAY EXAM NECK SPINE 2-3 VW: CPT | Mod: 26

## 2025-02-04 PROCEDURE — 99284 EMERGENCY DEPT VISIT MOD MDM: CPT | Mod: 25

## 2025-02-04 RX ORDER — LIDOCAINE HYDROCHLORIDE 30 MG/G
1 CREAM TOPICAL ONCE
Refills: 0 | Status: COMPLETED | OUTPATIENT
Start: 2025-02-04 | End: 2025-02-04

## 2025-02-04 RX ORDER — IBUPROFEN 600 MG/1
600 TABLET, FILM COATED ORAL ONCE
Refills: 0 | Status: COMPLETED | OUTPATIENT
Start: 2025-02-04 | End: 2025-02-04

## 2025-02-04 RX ADMIN — IBUPROFEN 600 MILLIGRAM(S): 600 TABLET, FILM COATED ORAL at 13:41

## 2025-02-04 RX ADMIN — LIDOCAINE HYDROCHLORIDE 1 PATCH: 30 CREAM TOPICAL at 13:41

## 2025-02-04 RX ADMIN — IBUPROFEN 600 MILLIGRAM(S): 600 TABLET, FILM COATED ORAL at 15:30

## 2025-02-04 NOTE — ED ADULT NURSE NOTE - NS ED NURSE LEVEL OF CONSCIOUSNESS ORIENTATION
Pt called asking for refill on her mesalamine, her last refill  on . Also she is having a flare up and her insurance will not cover the suppositories because she needs to try something different before they will cover it and was wondering if you can order her and enema or something to help with her flare.    Oriented - self; Oriented - place; Oriented - time

## 2025-02-04 NOTE — ED PROVIDER NOTE - PROVIDER TOKENS
PROVIDER:[TOKEN:[[id number]],FOLLOWUP:[1-3 Days]],PROVIDER:[TOKEN:[[id number]],FOLLOWUP:[7-10 Days]]
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
## 2025-02-04 NOTE — ED PROVIDER NOTE - CARE PROVIDERS DIRECT ADDRESSES
[email or web address]

## 2025-02-04 NOTE — ED PROVIDER NOTE - PROGRESS NOTE DETAILS
Reevaluated patient at bedside.  Patient feeling much improved.  Discussed the results of all diagnostic testing in ED. Advised rest, ice wbat, nsaids for pain, will rx flexeri, f/u with pcp/ortho.   An opportunity to ask questions was given.  Discussed the importance of prompt, close medical follow-up.  Patient will return with any changes, concerns or persistent / worsening symptoms.  Understanding of all instructions verbalized. Reevaluated patient at bedside.  Patient feeling much improved.  Discussed the results of all diagnostic testing in ED. Advised rest, ice wbat, nsaids for pain, will rx flexeril, f/u with pcp/ortho.   An opportunity to ask questions was given.  Discussed the importance of prompt, close medical follow-up.  Patient will return with any changes, concerns or persistent / worsening symptoms.  Understanding of all instructions verbalized.

## 2025-02-04 NOTE — ED PROVIDER NOTE - MUSCULOSKELETAL, MLM
+B paravertebral cervical, B paraspinal thoracic and lumbar spine, no midline spinal tenderness, no stepoffs/ecchymosis noted, no wrist or foot drop B, NVI, Spine appears normal, range of motion is not limited, BUE and BLE NT with FROM, NVI

## 2025-02-04 NOTE — ED PROVIDER NOTE - CARE PROVIDER_API CALL
Corbin Dumont  Internal Medicine  [address] [place] [address], [address]  [place] [address]  Phone: [phone]  Fax: [phone]  Follow Up Time: 1-3 Days    Camacho Serrano  Orthopaedic Surgery  [address] [place] [address]  Phone: [phone]  Fax: [phone]  Follow Up Time: 7-10 Days
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
## 2025-02-04 NOTE — ED PROVIDER NOTE - PATIENT PORTAL LINK FT
You can access the FollowMyHealth Patient Portal offered by [facility] by registering at the following website: [email or web address]. By joining [employer]’s FollowMyHealth portal, you will also be able to view your health information using other applications (apps) compatible with our system.
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
## 2025-04-07 ENCOUNTER — INPATIENT (INPATIENT)
Facility: HOSPITAL | Age: 50
LOS: 1 days | Discharge: HOME | DRG: 176 | End: 2025-04-09
Attending: INTERNAL MEDICINE | Admitting: INTERNAL MEDICINE
Payer: COMMERCIAL

## 2025-04-07 VITALS
RESPIRATION RATE: 20 BRPM | HEIGHT: 67 IN | HEART RATE: 86 BPM | SYSTOLIC BLOOD PRESSURE: 125 MMHG | WEIGHT: 164.91 LBS | OXYGEN SATURATION: 99 % | DIASTOLIC BLOOD PRESSURE: 67 MMHG | TEMPERATURE: 99 F

## 2025-04-07 DIAGNOSIS — I26.99 OTHER PULMONARY EMBOLISM WITHOUT ACUTE COR PULMONALE: ICD-10-CM

## 2025-04-07 LAB
ALBUMIN SERPL ELPH-MCNC: 4.1 G/DL — SIGNIFICANT CHANGE UP (ref 3.3–5)
ALP SERPL-CCNC: 59 U/L — SIGNIFICANT CHANGE UP (ref 30–120)
ALT FLD-CCNC: 19 U/L — SIGNIFICANT CHANGE UP (ref 10–60)
ANION GAP SERPL CALC-SCNC: 5 MMOL/L — SIGNIFICANT CHANGE UP (ref 5–17)
APTT BLD: 27 SEC — SIGNIFICANT CHANGE UP (ref 24.5–35.6)
AST SERPL-CCNC: 19 U/L — SIGNIFICANT CHANGE UP (ref 10–40)
BASOPHILS # BLD AUTO: 0.03 K/UL — SIGNIFICANT CHANGE UP (ref 0–0.2)
BASOPHILS NFR BLD AUTO: 0.3 % — SIGNIFICANT CHANGE UP (ref 0–2)
BILIRUB SERPL-MCNC: 0.8 MG/DL — SIGNIFICANT CHANGE UP (ref 0.2–1.2)
BUN SERPL-MCNC: 12 MG/DL — SIGNIFICANT CHANGE UP (ref 7–23)
CALCIUM SERPL-MCNC: 9.6 MG/DL — SIGNIFICANT CHANGE UP (ref 8.4–10.5)
CHLORIDE SERPL-SCNC: 103 MMOL/L — SIGNIFICANT CHANGE UP (ref 96–108)
CO2 SERPL-SCNC: 30 MMOL/L — SIGNIFICANT CHANGE UP (ref 22–31)
CREAT SERPL-MCNC: 0.78 MG/DL — SIGNIFICANT CHANGE UP (ref 0.5–1.3)
EGFR: 94 ML/MIN/1.73M2 — SIGNIFICANT CHANGE UP
EGFR: 94 ML/MIN/1.73M2 — SIGNIFICANT CHANGE UP
EOSINOPHIL # BLD AUTO: 0.07 K/UL — SIGNIFICANT CHANGE UP (ref 0–0.5)
EOSINOPHIL NFR BLD AUTO: 0.7 % — SIGNIFICANT CHANGE UP (ref 0–6)
GLUCOSE SERPL-MCNC: 101 MG/DL — HIGH (ref 70–99)
HCG SERPL-ACNC: <1 MIU/ML — SIGNIFICANT CHANGE UP
HCT VFR BLD CALC: 38.5 % — SIGNIFICANT CHANGE UP (ref 34.5–45)
HGB BLD-MCNC: 12.9 G/DL — SIGNIFICANT CHANGE UP (ref 11.5–15.5)
IMM GRANULOCYTES NFR BLD AUTO: 0.3 % — SIGNIFICANT CHANGE UP (ref 0–0.9)
INR BLD: 1.15 RATIO — SIGNIFICANT CHANGE UP (ref 0.85–1.16)
LYMPHOCYTES # BLD AUTO: 1.2 K/UL — SIGNIFICANT CHANGE UP (ref 1–3.3)
LYMPHOCYTES # BLD AUTO: 12.8 % — LOW (ref 13–44)
MCHC RBC-ENTMCNC: 30 PG — SIGNIFICANT CHANGE UP (ref 27–34)
MCHC RBC-ENTMCNC: 33.5 G/DL — SIGNIFICANT CHANGE UP (ref 32–36)
MCV RBC AUTO: 89.5 FL — SIGNIFICANT CHANGE UP (ref 80–100)
MONOCYTES # BLD AUTO: 0.63 K/UL — SIGNIFICANT CHANGE UP (ref 0–0.9)
MONOCYTES NFR BLD AUTO: 6.7 % — SIGNIFICANT CHANGE UP (ref 2–14)
NEUTROPHILS # BLD AUTO: 7.44 K/UL — HIGH (ref 1.8–7.4)
NEUTROPHILS NFR BLD AUTO: 79.2 % — HIGH (ref 43–77)
NRBC BLD AUTO-RTO: 0 /100 WBCS — SIGNIFICANT CHANGE UP (ref 0–0)
NT-PROBNP SERPL-SCNC: 111 PG/ML — SIGNIFICANT CHANGE UP (ref 0–125)
PLATELET # BLD AUTO: 146 K/UL — LOW (ref 150–400)
POTASSIUM SERPL-MCNC: 4.4 MMOL/L — SIGNIFICANT CHANGE UP (ref 3.5–5.3)
POTASSIUM SERPL-SCNC: 4.4 MMOL/L — SIGNIFICANT CHANGE UP (ref 3.5–5.3)
PROT SERPL-MCNC: 7.9 G/DL — SIGNIFICANT CHANGE UP (ref 6–8.3)
PROTHROM AB SERPL-ACNC: 13.5 SEC — HIGH (ref 9.9–13.4)
RBC # BLD: 4.3 M/UL — SIGNIFICANT CHANGE UP (ref 3.8–5.2)
RBC # FLD: 12.5 % — SIGNIFICANT CHANGE UP (ref 10.3–14.5)
SODIUM SERPL-SCNC: 138 MMOL/L — SIGNIFICANT CHANGE UP (ref 135–145)
TROPONIN I, HIGH SENSITIVITY RESULT: <4 NG/L — SIGNIFICANT CHANGE UP
WBC # BLD: 9.4 K/UL — SIGNIFICANT CHANGE UP (ref 3.8–10.5)
WBC # FLD AUTO: 9.4 K/UL — SIGNIFICANT CHANGE UP (ref 3.8–10.5)

## 2025-04-07 PROCEDURE — 71275 CT ANGIOGRAPHY CHEST: CPT | Mod: 26

## 2025-04-07 PROCEDURE — 93010 ELECTROCARDIOGRAM REPORT: CPT

## 2025-04-07 PROCEDURE — 99285 EMERGENCY DEPT VISIT HI MDM: CPT

## 2025-04-07 RX ORDER — HEPARIN SODIUM 1000 [USP'U]/ML
6000 INJECTION INTRAVENOUS; SUBCUTANEOUS ONCE
Refills: 0 | Status: DISCONTINUED | OUTPATIENT
Start: 2025-04-07 | End: 2025-04-08

## 2025-04-07 RX ORDER — HEPARIN SODIUM 1000 [USP'U]/ML
6000 INJECTION INTRAVENOUS; SUBCUTANEOUS EVERY 6 HOURS
Refills: 0 | Status: DISCONTINUED | OUTPATIENT
Start: 2025-04-07 | End: 2025-04-08

## 2025-04-07 RX ORDER — HEPARIN SODIUM 1000 [USP'U]/ML
INJECTION INTRAVENOUS; SUBCUTANEOUS
Qty: 25000 | Refills: 0 | Status: DISCONTINUED | OUTPATIENT
Start: 2025-04-07 | End: 2025-04-08

## 2025-04-07 RX ORDER — HEPARIN SODIUM 1000 [USP'U]/ML
3000 INJECTION INTRAVENOUS; SUBCUTANEOUS EVERY 6 HOURS
Refills: 0 | Status: DISCONTINUED | OUTPATIENT
Start: 2025-04-07 | End: 2025-04-08

## 2025-04-07 RX ORDER — ACETAMINOPHEN 500 MG/5ML
650 LIQUID (ML) ORAL EVERY 6 HOURS
Refills: 0 | Status: DISCONTINUED | OUTPATIENT
Start: 2025-04-07 | End: 2025-04-09

## 2025-04-07 RX ADMIN — Medication 1000 MILLILITER(S): at 19:45

## 2025-04-07 RX ADMIN — HEPARIN SODIUM 1300 UNIT(S)/HR: 1000 INJECTION INTRAVENOUS; SUBCUTANEOUS at 19:50

## 2025-04-07 RX ADMIN — HEPARIN SODIUM 6000 UNIT(S): 1000 INJECTION INTRAVENOUS; SUBCUTANEOUS at 19:48

## 2025-04-07 NOTE — ED ADULT TRIAGE NOTE - CCCP TRG CHIEF CMPLNT
"""Phaco with IOL OS: 02/12/2021 [employer] Toric TFNT30 17.5 (ORA) @ 58-60Âº (ORA) see chief complaint quote

## 2025-04-07 NOTE — H&P ADULT - HISTORY OF PRESENT ILLNESS
49-year-old female with no past medical historyof MVP,  presents with a DVT.  Patient reports in March she fractured her left foot.  Patient has been using a scooter at work which has caused severe left leg pain.  Patient went back to orthopedist today who did an ultrasound showing DVT.  Patient reports she had acute onset of sharp chest pain that resolved.  Patient denies fever or shortness of breath vomiting, diarrhea, IN ED had CT angio-b/l pulmonary embolism./67, HR 86, RR20, oxygenating well, Had Consult with PERT teamRecommend PE treatment per NH protocol with heparin gtt, 2D echo and pulmonary medicine and hemeon consult for further evaluation. Please call PERT consultation if she clinically worsens  Pt started on Heparin and is admitted

## 2025-04-07 NOTE — ED ADULT TRIAGE NOTE - CHIEF COMPLAINT QUOTE
I had a doppler done today and I was told I had a blood clot. My doctor told me to come here for treatment.

## 2025-04-07 NOTE — ED PROVIDER NOTE - CARDIAC, MLM
Normal rate, regular rhythm.  Heart sounds S1, S2.  No murmurs, rubs or gallops. left le edema with calf tenderness nvi

## 2025-04-07 NOTE — ED PROVIDER NOTE - CLINICAL SUMMARY MEDICAL DECISION MAKING FREE TEXT BOX
49-year-old female with a history of a recent left foot fracture.  The patient is been wearing a brace, for nonsurgical treatment.  The patient has been having some pain in the calf for the past 1 to 2 weeks.  The patient was sent for an outpatient ultrasound which showed an acute DVT.  Patient did have an episode of chest pain last night, but has not had any further chest pain today.  No shortness of breath.  No weakness or dizziness.  No numbness or tingling in the arms or legs.  No aggravating or alleviating factors otherwise noted.  No other acute complaints.  Exam: Nontoxic, well-appearing.  Normal respiratory effort.  CTA BL, no W/R/R.  Normal cardiac exam.  Abdomen soft, nontender, nondistended.  No HSM.  No CVAT.  Mild left calf tenderness.  minimal edema.  normal distal strength and sensation equal bilaterally.  2+ pulse.  normal cap refill.  No groin tenderness.  No other acute findings.  Acute DVT with an episode of chest pain.  Will check labs, CTA, anticoagulation, reeval

## 2025-04-07 NOTE — ED PROVIDER NOTE - OBJECTIVE STATEMENT
Patient is a 49-year-old female with no significant past medical history presents with a DVT.  Patient reports in March she fractured her left foot.  Patient has been using a scooter at work which has caused severe left leg pain.  Patient went back to orthopedist today who did an ultrasound showing DVT.  Patient reports she had acute onset of sharp chest pain that resolved.  Patient denies fever or shortness of breath vomiting diarrhea

## 2025-04-07 NOTE — PATIENT PROFILE ADULT - FALL HARM RISK - RISK INTERVENTIONS

## 2025-04-07 NOTE — ED ADULT NURSE NOTE - OBJECTIVE STATEMENT
49-year-old female with no significant past medical history presents with a DVT.  Patient reports in March she fractured her left foot.  Patient has been using a scooter at work which has caused severe left leg pain.  Patient went back to orthopedist today who did an ultrasound showing DVT.  Patient reports she had acute onset of sharp chest pain that resolved.  Patient denies fever or shortness of breath vomiting diarrhea

## 2025-04-07 NOTE — CHART NOTE - NSCHARTNOTEFT_GEN_A_CORE
PERT Consult Note    Ms. Hancock is a 50yo with a recent (3/2025) left fracture c/b by L DVT and now extensive b/l PE. In The ED she was noted be normotensive and with normal oxygenation on RA. BNP and Trop x1 wnl. Recommend PE treatment per NH protocol with heparin gtt, 2D echo and pulmonary medicine and hemeonc consult for further evaluation. Please call PERT consultation if she clinically worsens. Discussed with Shanita Peter.

## 2025-04-07 NOTE — H&P ADULT - ASSESSMENT
49-year-old female with no past medical historyof MVP,  presents with a DVT.  Patient reports in March she fractured her left foot.  Patient has been using a scooter at work which has caused severe left leg pain.  Patient went back to orthopedist today who did an ultrasound showing DVT.  Patient reports she had acute onset of sharp chest pain that resolved.  Patient denies fever or shortness of breath vomiting, diarrhea, IN ED had CT angio-b/l pulmonary embolism./67, HR 86, RR20, oxygenating well, Had Consult with PERT teamRecommend PE treatment per NH protocol with heparin gtt, 2D echo and pulmonary medicine and hemeonc consult for further evaluation. Please call PERT consultation if she clinically worsens  Pt started on Heparin and is admitted    # B/l Pulmonary emboism  heparin infusion   pulmonary consu;t   Hemeonc consult  #h/o MVP       49-year-old female with no past medical historyof MVP,  presents with a DVT.  Patient reports in March she fractured her left foot.  Patient has been using a scooter at work which has caused severe left leg pain.  Patient went back to orthopedist today who did an ultrasound showing DVT.  Patient reports she had acute onset of sharp chest pain that resolved.  Patient denies fever or shortness of breath vomiting, diarrhea, IN ED had CT angio-b/l pulmonary embolism./67, HR 86, RR20, oxygenating well, Had Consult with PERT teamRecommend PE treatment per NH protocol with heparin gtt, 2D echo and pulmonary medicine and hemeonc consult for further evaluation. Please call PERT consultation if she clinically worsens  Pt started on Heparin and is admitted    # B/l Pulmonary emboism, s/p DVT, s/p foot fracture  heparin infusion incentive spirometry   pulmonary consu;t-   Hemeonc consult  # Left leg DVT  on heparin infusion  # left foot fracture   cont care as per ortho   Pt eval  #h/o MVP  cardio consult

## 2025-04-07 NOTE — ED PROVIDER NOTE - PROGRESS NOTE DETAILS
Ultrasound reviewed from [facility] radiology completed today reports extensive left lower extremity DVT.  Extensive DVT is noted, partially occlusive in the upper to mid femoral, occlusive in the lower femoral, popliteal and posterior tibial veins. consulted the perc team through the transfer center spoke to resident sukhdev advised she needs to speak to her senior advised hold off on ac until she gets recs. consulted the pert team through the transfer center spoke to resident sukhdev advised she needs to speak to her senior advised hold off on ac until she gets recs. spoke to dr hahn pert team advised heparin drip and admit to [place]. consulted dr nguyen pulm attending advised he will see pt tomorrow. dr rea admitting attending will kindly admit

## 2025-04-08 ENCOUNTER — RESULT REVIEW (OUTPATIENT)
Age: 50
End: 2025-04-08

## 2025-04-08 ENCOUNTER — TRANSCRIPTION ENCOUNTER (OUTPATIENT)
Age: 50
End: 2025-04-08

## 2025-04-08 LAB
ALBUMIN SERPL ELPH-MCNC: 3.6 G/DL — SIGNIFICANT CHANGE UP (ref 3.3–5)
ALP SERPL-CCNC: 53 U/L — SIGNIFICANT CHANGE UP (ref 30–120)
ALT FLD-CCNC: 17 U/L — SIGNIFICANT CHANGE UP (ref 10–60)
ANION GAP SERPL CALC-SCNC: 7 MMOL/L — SIGNIFICANT CHANGE UP (ref 5–17)
APTT BLD: 143.1 SEC — CRITICAL HIGH (ref 24.5–35.6)
AST SERPL-CCNC: 16 U/L — SIGNIFICANT CHANGE UP (ref 10–40)
BASOPHILS # BLD AUTO: 0.03 K/UL — SIGNIFICANT CHANGE UP (ref 0–0.2)
BASOPHILS NFR BLD AUTO: 0.4 % — SIGNIFICANT CHANGE UP (ref 0–2)
BILIRUB SERPL-MCNC: 1.1 MG/DL — SIGNIFICANT CHANGE UP (ref 0.2–1.2)
BUN SERPL-MCNC: 9 MG/DL — SIGNIFICANT CHANGE UP (ref 7–23)
CALCIUM SERPL-MCNC: 8.9 MG/DL — SIGNIFICANT CHANGE UP (ref 8.4–10.5)
CHLORIDE SERPL-SCNC: 101 MMOL/L — SIGNIFICANT CHANGE UP (ref 96–108)
CO2 SERPL-SCNC: 29 MMOL/L — SIGNIFICANT CHANGE UP (ref 22–31)
CREAT SERPL-MCNC: 0.65 MG/DL — SIGNIFICANT CHANGE UP (ref 0.5–1.3)
EGFR: 109 ML/MIN/1.73M2 — SIGNIFICANT CHANGE UP
EGFR: 109 ML/MIN/1.73M2 — SIGNIFICANT CHANGE UP
EOSINOPHIL # BLD AUTO: 0.07 K/UL — SIGNIFICANT CHANGE UP (ref 0–0.5)
EOSINOPHIL NFR BLD AUTO: 0.8 % — SIGNIFICANT CHANGE UP (ref 0–6)
GLUCOSE SERPL-MCNC: 106 MG/DL — HIGH (ref 70–99)
HCT VFR BLD CALC: 35.3 % — SIGNIFICANT CHANGE UP (ref 34.5–45)
HGB BLD-MCNC: 12 G/DL — SIGNIFICANT CHANGE UP (ref 11.5–15.5)
IMM GRANULOCYTES NFR BLD AUTO: 0.2 % — SIGNIFICANT CHANGE UP (ref 0–0.9)
LYMPHOCYTES # BLD AUTO: 1.22 K/UL — SIGNIFICANT CHANGE UP (ref 1–3.3)
LYMPHOCYTES # BLD AUTO: 14.7 % — SIGNIFICANT CHANGE UP (ref 13–44)
MCHC RBC-ENTMCNC: 30.1 PG — SIGNIFICANT CHANGE UP (ref 27–34)
MCHC RBC-ENTMCNC: 34 G/DL — SIGNIFICANT CHANGE UP (ref 32–36)
MCV RBC AUTO: 88.5 FL — SIGNIFICANT CHANGE UP (ref 80–100)
MONOCYTES # BLD AUTO: 0.55 K/UL — SIGNIFICANT CHANGE UP (ref 0–0.9)
MONOCYTES NFR BLD AUTO: 6.6 % — SIGNIFICANT CHANGE UP (ref 2–14)
NEUTROPHILS # BLD AUTO: 6.42 K/UL — SIGNIFICANT CHANGE UP (ref 1.8–7.4)
NEUTROPHILS NFR BLD AUTO: 77.3 % — HIGH (ref 43–77)
NRBC BLD AUTO-RTO: 0 /100 WBCS — SIGNIFICANT CHANGE UP (ref 0–0)
PLAT MORPH BLD: NORMAL — SIGNIFICANT CHANGE UP
PLATELET # BLD AUTO: 135 K/UL — LOW (ref 150–400)
PLATELET COUNT - ESTIMATE: ABNORMAL
POTASSIUM SERPL-MCNC: 4.1 MMOL/L — SIGNIFICANT CHANGE UP (ref 3.5–5.3)
POTASSIUM SERPL-SCNC: 4.1 MMOL/L — SIGNIFICANT CHANGE UP (ref 3.5–5.3)
PROT SERPL-MCNC: 7 G/DL — SIGNIFICANT CHANGE UP (ref 6–8.3)
RBC # BLD: 3.99 M/UL — SIGNIFICANT CHANGE UP (ref 3.8–5.2)
RBC # FLD: 12.3 % — SIGNIFICANT CHANGE UP (ref 10.3–14.5)
RBC BLD AUTO: NORMAL — SIGNIFICANT CHANGE UP
SODIUM SERPL-SCNC: 137 MMOL/L — SIGNIFICANT CHANGE UP (ref 135–145)
TROPONIN I, HIGH SENSITIVITY RESULT: <4 NG/L — SIGNIFICANT CHANGE UP
WBC # BLD: 8.31 K/UL — SIGNIFICANT CHANGE UP (ref 3.8–10.5)
WBC # FLD AUTO: 8.31 K/UL — SIGNIFICANT CHANGE UP (ref 3.8–10.5)

## 2025-04-08 PROCEDURE — 93306 TTE W/DOPPLER COMPLETE: CPT | Mod: 26

## 2025-04-08 RX ORDER — APIXABAN 2.5 MG/1
10 TABLET, FILM COATED ORAL EVERY 12 HOURS
Refills: 0 | Status: DISCONTINUED | OUTPATIENT
Start: 2025-04-08 | End: 2025-04-09

## 2025-04-08 RX ORDER — APIXABAN 2.5 MG/1
1 TABLET, FILM COATED ORAL
Refills: 0 | DISCHARGE

## 2025-04-08 RX ORDER — APIXABAN 2.5 MG/1
1 TABLET, FILM COATED ORAL
Qty: 74 | Refills: 0
Start: 2025-04-08

## 2025-04-08 RX ORDER — APIXABAN 2.5 MG/1
2 TABLET, FILM COATED ORAL
Qty: 74 | Refills: 0
Start: 2025-04-08

## 2025-04-08 RX ORDER — APIXABAN 2.5 MG/1
1 TABLET, FILM COATED ORAL
Refills: 0
Start: 2025-04-08

## 2025-04-08 RX ADMIN — HEPARIN SODIUM 1100 UNIT(S)/HR: 1000 INJECTION INTRAVENOUS; SUBCUTANEOUS at 07:22

## 2025-04-08 RX ADMIN — HEPARIN SODIUM 1100 UNIT(S)/HR: 1000 INJECTION INTRAVENOUS; SUBCUTANEOUS at 03:25

## 2025-04-08 RX ADMIN — APIXABAN 10 MILLIGRAM(S): 2.5 TABLET, FILM COATED ORAL at 10:13

## 2025-04-08 RX ADMIN — HEPARIN SODIUM 0 UNIT(S)/HR: 1000 INJECTION INTRAVENOUS; SUBCUTANEOUS at 02:23

## 2025-04-08 RX ADMIN — APIXABAN 10 MILLIGRAM(S): 2.5 TABLET, FILM COATED ORAL at 21:30

## 2025-04-08 NOTE — CARE COORDINATION ASSESSMENT. - NSPASTMEDSURGHISTORY_GEN_ALL_CORE_FT
PAST MEDICAL & SURGICAL HISTORY:  Mitral valve prolapse      DVT, lower extremity       never used decafe/caffeine/never used

## 2025-04-08 NOTE — CONSULT NOTE ADULT - SUBJECTIVE AND OBJECTIVE BOX
Date/Time Patient Seen:  		  Referring MD:   Data Reviewed	       Patient is a 49y old  Female who presents with a chief complaint of dvt (07 Apr 2025 20:22)      Subjective/HPI    History of Present Illness:  Reason for Admission: dvt  History of Present Illness:   49-year-old female with no past medical historyof MVP,  presents with a DVT.  Patient reports in March she fractured her left foot.  Patient has been using a scooter at work which has caused severe left leg pain.  Patient went back to orthopedist today who did an ultrasound showing DVT.  Patient reports she had acute onset of sharp chest pain that resolved.  Patient denies fever or shortness of breath vomiting, diarrhea, IN ED had CT angio-b/l pulmonary embolism./67, HR 86, RR20, oxygenating well, Had Consult with PERT teamRecommend PE treatment per NH protocol with heparin gtt, 2D echo and pulmonary medicine and hemeonc consult for further evaluation. Please call PERT consultation if she clinically worsens  Pt started on Heparin and is admitted        Allergies and Intolerances:        Allergies:  	pt is allergic to eggplant and reaction she gets cyst on the face. [freetext allergy; no alerts]: Food, Other, pt is allergic to eggplant and reaction she gets cyst on the face.  	No Known Drug Allergies:     Home Medications:   * Incomplete Medication History as of 04-Feb-2025 12:58 documented in Structured Notes  · 	cyclobenzaprine 10 mg oral tablet: 1 tab(s) orally every 8 hours as needed for  muscle spasm DO NOT DRIVE OR OPERATE MACHINERY WHILE TAKING THIS MEDICATION DUE TO IT'S SEDATIVE SIDE EFFECTS    .    Patient History:    Tobacco Screening:  · Core Measure Site	No    Risk Assessment:    Hepatitis C Test Questions:  · In accordance with [place] State Law, we offer every patient a Hepatitis C test. Would you like to be tested today?	Opt out    PAST MEDICAL & SURGICAL HISTORY:  Mitral valve prolapse    DVT, lower extremity    No significant past surgical history          Medication list         MEDICATIONS  (STANDING):  heparin   Injectable 6000 Unit(s) IV Push once  heparin  Infusion.  Unit(s)/Hr (13 mL/Hr) IV Continuous <Continuous>    MEDICATIONS  (PRN):  acetaminophen     Tablet .. 650 milliGRAM(s) Oral every 6 hours PRN Temp greater or equal to 38C (100.4F), Mild Pain (1 - 3), Moderate Pain (4 - 6)  heparin   Injectable 6000 Unit(s) IV Push every 6 hours PRN For aPTT less than 40  heparin   Injectable 3000 Unit(s) IV Push every 6 hours PRN For aPTT between 40 - 57         Vitals log        ICU Vital Signs Last 24 Hrs  T(C): 36.9 (08 Apr 2025 00:33), Max: 37.4 (07 Apr 2025 22:29)  T(F): 98.5 (08 Apr 2025 00:33), Max: 99.3 (07 Apr 2025 22:29)  HR: 71 (08 Apr 2025 02:26) (71 - 86)  BP: 124/64 (08 Apr 2025 02:26) (117/80 - 125/67)  BP(mean): 80 (08 Apr 2025 02:26) (80 - 88)  ABP: --  ABP(mean): --  RR: 18 (08 Apr 2025 02:26) (15 - 20)  SpO2: 97% (08 Apr 2025 02:26) (95% - 99%)    O2 Parameters below as of 08 Apr 2025 00:00  Patient On (Oxygen Delivery Method): room air                 Input and Output:  I&O's Detail    07 Apr 2025 07:01  -  08 Apr 2025 05:04  --------------------------------------------------------  IN:    Heparin Infusion: 65 mL  Total IN: 65 mL    OUT:  Total OUT: 0 mL    Total NET: 65 mL          Lab Data                        12.9   9.40  )-----------( 146      ( 07 Apr 2025 17:30 )             38.5     04-07    138  |  103  |  12  ----------------------------<  101[H]  4.4   |  30  |  0.78    Ca    9.6      07 Apr 2025 17:30    TPro  7.9  /  Alb  4.1  /  TBili  0.8  /  DBili  x   /  AST  19  /  ALT  19  /  AlkPhos  59  04-07            Review of Systems	      Objective     Physical Examination        Pertinent Lab findings & Imaging      Jordan:  NO   Adequate UO     I&O's Detail    07 Apr 2025 07:01 - 08 Apr 2025 05:04  --------------------------------------------------------  IN:    Heparin Infusion: 65 mL  Total IN: 65 mL    OUT:  Total OUT: 0 mL    Total NET: 65 mL               Discussed with:     Cultures:	        Radiology        ACC: [id number] [facility]   ORDERED BY:   WANDA PETER     PROCEDURE DATE:  04/07/2025          INTERPRETATION:  HISTORY: Rule out pulmonary embolism.    EXAMINATION: CTA CHEST was performed for evaluation of the pulmonary   arteries. Multiplanar reformatted images and MIPS were acquired.  CONTRAST/COMPLICATIONS:  IV Contrast: Omnipaque 350  84 cc administered   16 cc discarded  Oral Contrast: NONE    COMPARISON: None available.    FINDINGS:    PULMONARY ARTERIES: Pulmonary embolism within the bilateral interlobar   pulmonary arteries, truncus anterior, and proximal segmental branches of   all lung lobes.    MEDIASTINUM: Heart size qualitatively normal. No pericardial effusion.   Thoracic aorta normal caliber.  No large mediastinal lymph nodes.    AIRWAYS, LUNGS, PLEURA: Clear central airways. No consolidation. Right   middle lobe calcified granuloma. No pleural effusion.    IMAGED ABDOMEN: Unremarkable.    SOFT TISSUES: Unremarkable.    BONES: Unremarkable.      IMPRESSION:.    Pulmonary embolism within bilateral interlobar pulmonary arteries,   truncus anterior, and proximal segmental branches of all lung lobes.    Findings discussed with Wanda Peter (advanced clinical provider).    --- End of Report ---             SAMMY CALZADA MD; Attending Radiologist  This document has been electronically signed. Apr 7 2025  6:59PM                       Date/Time Patient Seen:  		  Referring MD:   Data Reviewed	       Patient is a 49y old  Female who presents with a chief complaint of dvt (07 Apr 2025 20:22)      Subjective/HPI    History of Present Illness:  Reason for Admission: dvt  History of Present Illness:   49-year-old female with no past medical history of MVP,  presents with a DVT.  Patient reports in March she fractured her left foot.  Patient has been using a scooter at work which has caused severe left leg pain.  Patient went back to orthopedist today who did an ultrasound showing DVT.  Patient reports she had acute onset of sharp chest pain that resolved.  Patient denies fever or shortness of breath vomiting, diarrhea, IN ED had CT angio-b/l pulmonary embolism./67, HR 86, RR20, oxygenating well, Had Consult with PERT teamRecommend PE treatment per NH protocol with heparin gtt, 2D echo and pulmonary medicine and hemeonc consult for further evaluation. Please call PERT consultation if she clinically worsens  Pt started on Heparin and is admitted        Allergies and Intolerances:        Allergies:  	pt is allergic to eggplant and reaction she gets cyst on the face. [freetext allergy; no alerts]: Food, Other, pt is allergic to eggplant and reaction she gets cyst on the face.  	No Known Drug Allergies:     Home Medications:   * Incomplete Medication History as of 04-Feb-2025 12:58 documented in Structured Notes  · 	cyclobenzaprine 10 mg oral tablet: 1 tab(s) orally every 8 hours as needed for  muscle spasm DO NOT DRIVE OR OPERATE MACHINERY WHILE TAKING THIS MEDICATION DUE TO IT'S SEDATIVE SIDE EFFECTS    .    Patient History:    Tobacco Screening:  · Core Measure Site	No    Risk Assessment:    Hepatitis C Test Questions:  · In accordance with [place] State Law, we offer every patient a Hepatitis C test. Would you like to be tested today?	Opt out    PAST MEDICAL & SURGICAL HISTORY:  Mitral valve prolapse    DVT, lower extremity    No significant past surgical history          Medication list         MEDICATIONS  (STANDING):  heparin   Injectable 6000 Unit(s) IV Push once  heparin  Infusion.  Unit(s)/Hr (13 mL/Hr) IV Continuous <Continuous>    MEDICATIONS  (PRN):  acetaminophen     Tablet .. 650 milliGRAM(s) Oral every 6 hours PRN Temp greater or equal to 38C (100.4F), Mild Pain (1 - 3), Moderate Pain (4 - 6)  heparin   Injectable 6000 Unit(s) IV Push every 6 hours PRN For aPTT less than 40  heparin   Injectable 3000 Unit(s) IV Push every 6 hours PRN For aPTT between 40 - 57         Vitals log        ICU Vital Signs Last 24 Hrs  T(C): 36.9 (08 Apr 2025 00:33), Max: 37.4 (07 Apr 2025 22:29)  T(F): 98.5 (08 Apr 2025 00:33), Max: 99.3 (07 Apr 2025 22:29)  HR: 71 (08 Apr 2025 02:26) (71 - 86)  BP: 124/64 (08 Apr 2025 02:26) (117/80 - 125/67)  BP(mean): 80 (08 Apr 2025 02:26) (80 - 88)  ABP: --  ABP(mean): --  RR: 18 (08 Apr 2025 02:26) (15 - 20)  SpO2: 97% (08 Apr 2025 02:26) (95% - 99%)    O2 Parameters below as of 08 Apr 2025 00:00  Patient On (Oxygen Delivery Method): room air                 Input and Output:  I&O's Detail    07 Apr 2025 07:01  -  08 Apr 2025 05:04  --------------------------------------------------------  IN:    Heparin Infusion: 65 mL  Total IN: 65 mL    OUT:  Total OUT: 0 mL    Total NET: 65 mL          Lab Data                        12.9   9.40  )-----------( 146      ( 07 Apr 2025 17:30 )             38.5     04-07    138  |  103  |  12  ----------------------------<  101[H]  4.4   |  30  |  0.78    Ca    9.6      07 Apr 2025 17:30    TPro  7.9  /  Alb  4.1  /  TBili  0.8  /  DBili  x   /  AST  19  /  ALT  19  /  AlkPhos  59  04-07            Review of Systems	  pain left foot  weakness  anxious  on RA  alert  verbal  oriented      Objective     Physical Examination  heart s1s2  lung dc bs  head nc  head at  abd soft        Pertinent Lab findings & Imaging      Ortega:  NO   Adequate UO     I&O's Detail    07 Apr 2025 07:01  -  08 Apr 2025 05:04  --------------------------------------------------------  IN:    Heparin Infusion: 65 mL  Total IN: 65 mL    OUT:  Total OUT: 0 mL    Total NET: 65 mL               Discussed with:     Cultures:	        Radiology        ACC: [id number] [facility]   ORDERED BY:   WANDA TOVAR     PROCEDURE DATE:  04/07/2025          INTERPRETATION:  HISTORY: Rule out pulmonary embolism.    EXAMINATION: CTA CHEST was performed for evaluation of the pulmonary   arteries. Multiplanar reformatted images and MIPS were acquired.  CONTRAST/COMPLICATIONS:  IV Contrast: Omnipaque 350  84 cc administered   16 cc discarded  Oral Contrast: NONE    COMPARISON: None available.    FINDINGS:    PULMONARY ARTERIES: Pulmonary embolism within the bilateral interlobar   pulmonary arteries, truncus anterior, and proximal segmental branches of   all lung lobes.    MEDIASTINUM: Heart size qualitatively normal. No pericardial effusion.   Thoracic aorta normal caliber.  No large mediastinal lymph nodes.    AIRWAYS, LUNGS, PLEURA: Clear central airways. No consolidation. Right   middle lobe calcified granuloma. No pleural effusion.    IMAGED ABDOMEN: Unremarkable.    SOFT TISSUES: Unremarkable.    BONES: Unremarkable.      IMPRESSION:.    Pulmonary embolism within bilateral interlobar pulmonary arteries,   truncus anterior, and proximal segmental branches of all lung lobes.    Findings discussed with Wanda Tovar (advanced clinical provider).    --- End of Report ---             SAMMY CALZADA MD; Attending Radiologist  This document has been electronically signed. Apr 7 2025  6:59PM

## 2025-04-08 NOTE — CARE COORDINATION ASSESSMENT. - NSCAREPROVIDERS_GEN_ALL_CORE_FT
CARE PROVIDERS:  Accepting Physician: Tereza Arreola  Administration: Mariel Flor  Administration: Yaquelin Lares  Admitting: Tereza Arreola  Attending: Tereza Arreola  Cardiology Technician: Cecy Duarte  Consultant: Tereza Arreola  Consultant: Juliocesar Walters  Consultant: Tiburcio Swann  Consultant: Kylee Auguste  Consultant: Milton Araujo  Consultant: Perla Nicolas  Consultant: Arnold Kaur  Covering Team: Clarence Zurita  ED ACP: Wanda Peter  ED Attending: Brandt Fish  ED Nurse: Shasha Graves  Infection Control: Piper Walker  Nurse: Rose Henderson  Nurse: Marlene Sloan  Nurse: Shasha Graves  Nurse: Dahiana Reeves  Nurse: Suzy Malone  Nurse: Arabella Anderson  Nurse: Annika Masters  Outpatient Provider: Jose Freed  Outpatient Provider: Corbin Dumont  Override: Sara Alston  PCA/Nursing Assistant: Jane Matthews  Primary Team: Dolly Shafer  Primary Team: Tereza Arreola  Registered Dietitian: Anna Maxwell  Registered Dietitian: Suzy Meadows  Respiratory Therapy: Caitlyn Hummel  : Erin Huffman  Team: Team, Critical Care Non Applicable  UR// Supp. Assoc.: Pearl Trimble

## 2025-04-08 NOTE — CASE MANAGEMENT PROGRESS NOTE - NSCMPROGRESSNOTE_GEN_ALL_CORE
Update Communication Note: CM met with patient at bedside and review homecare services with patient with a verbal understanding. DR. Arreola at bedside assessing patient. Pending PT Consult, Pending Orthro Consult, and Pending Heme Consult. Patient education reviewed with verbal understanding regarding Eliquis. Plan for Discharge tomorrow with [facility] agency [phone] will reach out to you within 24-72 hours of your discharge to schedule home care visit/eval appointment with you. Please call agency for any queries regarding home care services. Will continue to follow patient.

## 2025-04-08 NOTE — DISCHARGE NOTE NURSING/CASE MANAGEMENT/SOCIAL WORK - PATIENT PORTAL LINK FT
You can access the FollowMyHealth Patient Portal offered by [facility] by registering at the following website: [email or web address]. By joining [facility]’s FollowMyHealth portal, you will also be able to view your health information using other applications (apps) compatible with our system.

## 2025-04-08 NOTE — DISCHARGE NOTE NURSING/CASE MANAGEMENT/SOCIAL WORK - NSDCFUADDAPPT_GEN_ALL_CORE_FT
CM assisted patient with making follow up MD appointments:  [facility] sendy Enriquez [phone] in [place] on  first available 4/30/25 at 2:30 pm  Called made appt with PCP Dr Corbin Dumont [phone] on 4/16/25 3:45 pm and staff Jackie will speak with the team to see if they can get pt. in sooner.  If so they will call you directly.
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
## 2025-04-08 NOTE — CONSULT NOTE ADULT - SUBJECTIVE AND OBJECTIVE BOX
All records reviewed.    HPI:    50 yo woman w hx MVP, left foot fx 3/2025 w assoc decr mobility req using scooter for movements but dev left leg pain w use, seen by Ortho and US showed DVT  On adm, CTA chest/PE protocol 4/7 sig for all lobes PE.  Pt did have one episode of CP, denies recur, SOB,  fever, malaise  Started IV heparin and changed to Eliquis      PAST MEDICAL & SURGICAL HISTORY:  Mitral valve prolapse      DVT, lower extremity          Review of System:  see above    MEDICATIONS  (STANDING):  apixaban 10 milliGRAM(s) Oral every 12 hours    MEDICATIONS  (PRN):  acetaminophen     Tablet .. 650 milliGRAM(s) Oral every 6 hours PRN Temp greater or equal to 38C (100.4F), Mild Pain (1 - 3), Moderate Pain (4 - 6)      Allergies    No Known Drug Allergies  pt is allergic to eggplant and reaction she gets cyst on the face. (Other)    Intolerances        SOCIAL HISTORY:    FAMILY HISTORY:      Vital Signs Last 24 Hrs  T(C): 36.6 (08 Apr 2025 12:26), Max: 37.4 (07 Apr 2025 22:29)  T(F): 97.9 (08 Apr 2025 12:26), Max: 99.3 (07 Apr 2025 22:29)  HR: 76 (08 Apr 2025 12:00) (65 - 86)  BP: 116/59 (08 Apr 2025 12:00) (106/64 - 125/67)  BP(mean): 76 (08 Apr 2025 12:00) (76 - 88)  RR: 14 (08 Apr 2025 12:00) (14 - 20)  SpO2: 97% (08 Apr 2025 12:00) (93% - 99%)    Parameters below as of 08 Apr 2025 12:00  Patient On (Oxygen Delivery Method): room air        PHYSICAL EXAM:      General: in no acute distress  Eyes:sclera anicteric, pupils equal and EOMI  ENMT:buccal mucosa moist  Neck:supple, trachea midline  Lungs:clear, no wheeze/rhonchi  Cardiovascular:regular rate and rhythm, S1 S2  Abdomen:soft, nontender, no organomegaly present, bowel sounds normal  Neurological:alert and oriented x3, Cranial Nerves II-XII grossly intact  Skin:no increased ecchymosis/petechiae/purpura  Lymph Nodes:no palpable cervical/supraclavicular lymph nodes enlargements  Extremities:no cyanosis/clubbing/edema        LABS:                        12.0   8.31  )-----------( 135      ( 08 Apr 2025 06:00 )             35.3     04-08 @ 06:00  WBC8.31  RBC3.99 Hgb12.0 Hct35.3  MCV88.5  [id number]  Auto Neutro-- Band-- Auto Lymph-- Atypical Lymph-- Reactive Lymph-- Auto Mono-- Auto Eos-- Auto Baso--        04-07 @ 17:30  WBC9.40  RBC4.30 Hgb12.9 Hct38.5  MCV89.5  [id number]  Auto Neutro-- Band-- Auto Lymph-- Atypical Lymph-- Reactive Lymph-- Auto Mono-- Auto Eos-- Auto Baso--          04-08    137  |  101  |  9   ----------------------------<  106[H]  4.1   |  29  |  0.65    Ca    8.9      08 Apr 2025 06:00    TPro  7.0  /  Alb  3.6  /  TBili  1.1  /  DBili  x   /  AST  16  /  ALT  17  /  AlkPhos  53  04-08 04-08 @ 01:22  PT-- INR--  XRP336.1  04-07 @ 17:29  PT13.5 INR1.15  PTT27.0    Urinalysis Basic - ( 08 Apr 2025 06:00 )    Color: x / Appearance: x / SG: x / pH: x  Gluc: 106 mg/dL / Ketone: x  / Bili: x / Urobili: x   Blood: x / Protein: x / Nitrite: x   Leuk Esterase: x / RBC: x / WBC x   Sq Epi: x / Non Sq Epi: x / Bacteria: x        PERIPHERAL BLOOD SMEAR REVIEW:      RADIOLOGY & ADDITIONAL STUDIES:  < from: CT Angio Chest PE Protocol w/ IV Cont (04.07.25 @ 18:43) >    ACC: [id number] EXAM:  CT ANGIO CHEST PULM ART [facility]   ORDERED BY:   WANDA PETER     PROCEDURE DATE:  04/07/2025          INTERPRETATION:  HISTORY: Rule out pulmonary embolism.    EXAMINATION: CTA CHEST was performed for evaluation of the pulmonary   arteries. Multiplanar reformatted images and MIPS were acquired.  CONTRAST/COMPLICATIONS:  IV Contrast: Omnipaque 350  84 cc administered   16 cc discarded  Oral Contrast: NONE    COMPARISON: None available.    FINDINGS:    PULMONARY ARTERIES: Pulmonary embolism within the bilateral interlobar   pulmonary arteries, truncus anterior, and proximal segmental branches of   all lung lobes.    MEDIASTINUM: Heart size qualitatively normal. No pericardial effusion.   Thoracic aorta normal caliber.  No large mediastinal lymph nodes.    AIRWAYS, LUNGS, PLEURA: Clear central airways. No consolidation. Right   middle lobe calcified granuloma. No pleural effusion.    IMAGED ABDOMEN: Unremarkable.    SOFT TISSUES: Unremarkable.    BONES: Unremarkable.      IMPRESSION:.    Pulmonary embolism within bilateral interlobar pulmonary arteries,   truncus anterior, and proximal segmental branches of all lung lobes.    Findings discussed with Wanda Peter (advanced clinical provider).    --- End of Report ---      < end of copied text >   All records reviewed.    seen w  present    HPI:    48 yo woman w hx MVP, left foot fx 3/2025(missed a step) placed in boot w assoc decr mobility req using scooter for movements, reports sig left calf pain ~2 wks after boot placement, seen by Ortho and US showed DVT(Ayanna 4/7 Duplex LLE left femoral/popliteal DVT)  On adm, CTA chest/PE protocol 4/7 sig for all lobes PE.  Pt did have one episode of CP(michael mid/upper chest) the night before admission, denies SOB,  fever, malaise--although w persisitent fatigue  Started IV heparin and changed to Eliquis    No family hx venous thromboembolism  Maternal Grandmother CAD/zuleyka was on Coumadin  Not on birth control  Former smoker age 20s      PAST MEDICAL & SURGICAL HISTORY:  Mitral valve prolapse      DVT, lower extremity          Review of System:  see above    MEDICATIONS  (STANDING):  apixaban 10 milliGRAM(s) Oral every 12 hours    MEDICATIONS  (PRN):  acetaminophen     Tablet .. 650 milliGRAM(s) Oral every 6 hours PRN Temp greater or equal to 38C (100.4F), Mild Pain (1 - 3), Moderate Pain (4 - 6)      Allergies    No Known Drug Allergies  pt is allergic to eggplant and reaction she gets cyst on the face. (Other)    Intolerances        SOCIAL HISTORY:  former smoker age 20s    FAMILY HISTORY:      Vital Signs Last 24 Hrs  T(C): 36.6 (08 Apr 2025 12:26), Max: 37.4 (07 Apr 2025 22:29)  T(F): 97.9 (08 Apr 2025 12:26), Max: 99.3 (07 Apr 2025 22:29)  HR: 76 (08 Apr 2025 12:00) (65 - 86)  BP: 116/59 (08 Apr 2025 12:00) (106/64 - 125/67)  BP(mean): 76 (08 Apr 2025 12:00) (76 - 88)  RR: 14 (08 Apr 2025 12:00) (14 - 20)  SpO2: 97% (08 Apr 2025 12:00) (93% - 99%)    Parameters below as of 08 Apr 2025 12:00  Patient On (Oxygen Delivery Method): room air        PHYSICAL EXAM:      General: in no acute distress  Eyes:sclera anicteric, pupils equal and EOMI  ENMT:buccal mucosa moist  Neck:supple, trachea midline  Lungs:clear, no wheeze/rhonchi  Cardiovascular:regular rate and rhythm, S1 S2  Abdomen:soft, nontender, no organomegaly present, bowel sounds normal  Neurological:alert and oriented x3, Cranial Nerves II-XII grossly intact  Skin:no increased ecchymosis/petechiae/purpura  Lymph Nodes:no palpable cervical/supraclavicular lymph nodes enlargements  Extremities:no cyanosis/clubbing. Trace edema left calf        LABS:                        12.0   8.31  )-----------( 135      ( 08 Apr 2025 06:00 )             35.3     04-08 @ 06:00  WBC8.31  RBC3.99 Hgb12.0 Hct35.3  MCV88.5  [id number]  Auto Neutro-- Band-- Auto Lymph-- Atypical Lymph-- Reactive Lymph-- Auto Mono-- Auto Eos-- Auto Baso--        04-07 @ 17:30  WBC9.40  RBC4.30 Hgb12.9 Hct38.5  MCV89.5  [id number]  Auto Neutro-- Band-- Auto Lymph-- Atypical Lymph-- Reactive Lymph-- Auto Mono-- Auto Eos-- Auto Baso--          04-08    137  |  101  |  9   ----------------------------<  106[H]  4.1   |  29  |  0.65    Ca    8.9      08 Apr 2025 06:00    TPro  7.0  /  Alb  3.6  /  TBili  1.1  /  DBili  x   /  AST  16  /  ALT  17  /  AlkPhos  53  04-08 04-08 @ 01:22  PT-- INR--  VLZ112.1  04-07 @ 17:29  PT13.5 INR1.15  PTT27.0    Urinalysis Basic - ( 08 Apr 2025 06:00 )    Color: x / Appearance: x / SG: x / pH: x  Gluc: 106 mg/dL / Ketone: x  / Bili: x / Urobili: x   Blood: x / Protein: x / Nitrite: x   Leuk Esterase: x / RBC: x / WBC x   Sq Epi: x / Non Sq Epi: x / Bacteria: x        PERIPHERAL BLOOD SMEAR REVIEW:      RADIOLOGY & ADDITIONAL STUDIES:  < from: CT Angio Chest PE Protocol w/ IV Cont (04.07.25 @ 18:43) >    ACC: [id number] EXAM:  CT ANGIO CHEST PULM ART [facility]   ORDERED BY:   HENOK PETER     PROCEDURE DATE:  04/07/2025          INTERPRETATION:  HISTORY: Rule out pulmonary embolism.    EXAMINATION: CTA CHEST was performed for evaluation of the pulmonary   arteries. Multiplanar reformatted images and MIPS were acquired.  CONTRAST/COMPLICATIONS:  IV Contrast: Omnipaque 350  84 cc administered   16 cc discarded  Oral Contrast: NONE    COMPARISON: None available.    FINDINGS:    PULMONARY ARTERIES: Pulmonary embolism within the bilateral interlobar   pulmonary arteries, truncus anterior, and proximal segmental branches of   all lung lobes.    MEDIASTINUM: Heart size qualitatively normal. No pericardial effusion.   Thoracic aorta normal caliber.  No large mediastinal lymph nodes.    AIRWAYS, LUNGS, PLEURA: Clear central airways. No consolidation. Right   middle lobe calcified granuloma. No pleural effusion.    IMAGED ABDOMEN: Unremarkable.    SOFT TISSUES: Unremarkable.    BONES: Unremarkable.      IMPRESSION:.    Pulmonary embolism within bilateral interlobar pulmonary arteries,   truncus anterior, and proximal segmental branches of all lung lobes.    Findings discussed with Henok Peter (advanced clinical provider).    --- End of Report ---      < end of copied text >

## 2025-04-08 NOTE — DISCHARGE NOTE NURSING/CASE MANAGEMENT/SOCIAL WORK - NSSCNAMETXT_GEN_ALL_CORE
[facility] care agency [phone] will reach out to you within 24-72 hours of your discharge to schedule home care visit/eval appointment with you. Please call agency for any queries regarding home care services

## 2025-04-08 NOTE — CONSULT NOTE ADULT - SUBJECTIVE AND OBJECTIVE BOX
History of Present Illness: The patient is a 49 year old female with a history of MVP who presents with DVT. She had fractured her foot 2-3 weeks ago and has been in a boot. She noted left leg pain and swelling. She underwent an ultrasound showing left leg DVT. She denies chest pain or shortness of breath. She has intermittent heartburn symptoms. No dizziness or palpitations.    Past Medical/Surgical History:  MVP    Medications:  None    Family History: Non-contributory family history of premature cardiovascular atherosclerotic disease    Social History: No tobacco, alcohol or drug use    Review of Systems:  General: No fevers, chills, weight gain  Skin: No rashes, color changes  Cardiovascular: No chest pain, orthopnea  Respiratory: No shortness of breath, cough  Gastrointestinal: No nausea, abdominal pain  Genitourinary: No incontinence, pain with urination  Musculoskeletal: +pain, swelling, decreased range of motion  Neurological: No headache, weakness  Psychiatric: No depression, anxiety  Endocrine: No weight gain, increased thirst  All other systems are comprehensively negative.    Physical Exam:  Vitals:        Vital Signs Last 24 Hrs  T(C): 37 (08 Apr 2025 08:12), Max: 37.4 (07 Apr 2025 22:29)  T(F): 98.6 (08 Apr 2025 08:12), Max: 99.3 (07 Apr 2025 22:29)  HR: 65 (08 Apr 2025 06:00) (65 - 86)  BP: 115/71 (08 Apr 2025 06:00) (113/63 - 125/67)  BP(mean): 83 (08 Apr 2025 06:00) (77 - 88)  RR: 14 (08 Apr 2025 06:00) (14 - 20)  SpO2: 97% (08 Apr 2025 06:00) (95% - 99%)    Parameters below as of 08 Apr 2025 04:00  Patient On (Oxygen Delivery Method): room air      General: NAD  HEENT: MMM  Neck: No JVD, no carotid bruit  Lungs: CTAB  CV: RRR, nl S1/S2, no M/R/G  Abdomen: S/NT/ND, +BS  Extremities: No LE edema, no cyanosis  Neuro: AAOx3, non-focal  Skin: No rash    Labs:                        12.0   8.31  )-----------( 135      ( 08 Apr 2025 06:00 )             35.3     04-08    137  |  101  |  9   ----------------------------<  106[H]  4.1   |  29  |  0.65    Ca    8.9      08 Apr 2025 06:00    TPro  7.0  /  Alb  3.6  /  TBili  1.1  /  DBili  x   /  AST  16  /  ALT  17  /  AlkPhos  53  04-08        PT/INR - ( 07 Apr 2025 17:29 )   PT: 13.5 sec;   INR: 1.15 ratio         PTT - ( 08 Apr 2025 01:22 )  PTT:143.1 sec    ECG/Telemetry: NSR, normal axis, no ST abnormality

## 2025-04-08 NOTE — DISCHARGE NOTE NURSING/CASE MANAGEMENT/SOCIAL WORK - FINANCIAL ASSISTANCE
[facility] provides services at a reduced cost to those who are determined to be eligible through [facility]’s financial assistance program. Information regarding [facility]’s financial assistance program can be found by going to [email or web address] or by calling [phone].

## 2025-04-08 NOTE — CONSULT NOTE ADULT - ASSESSMENT
48 yo woman w hx MVP, left foot fx 3/2025(missed a step) placed in boot w assoc decr mobility req using scooter for movements, reports sig left calf pain ~2 wks after boot placement, seen by Ortho and US showed DVT(Ayanna 4/7 Duplex LLE left femoral/popliteal DVT)  On adm, CTA chest/PE protocol 4/7 sig for all lobes PE.  Pt did have one episode of CP(michael mid/upper chest) the night before admission, denies SOB,  fever, malaise--although w persisitent fatigue  Started IV heparin and changed to Eliquis    No family hx venous thromboembolism  Maternal Grandmother CAD/cardiomegaly was on Coumadin  Not on birth control  Former smoker age 20s      -provoked left leg DVT, michael PE--recommend limited duration anticoagulation 3--6months (or longer dep on when pt returns to full baseline mobility from left foot fx healing)  -can continue on Eliquis, appear to tolerating well  -seen by cardiology, for echo, will be seen by Ortho as well during this admission  -ideally do not stop anticoag the first month unless emergency  -will followup in office to continue outpatient monitoring/thrombophilia workup (pt may choose to see [facility] due to PMD preference)  -consider vascular surgery followup(can be as outpatient)    discussed w pt and   thank you, please call if any questions

## 2025-04-08 NOTE — CONSULT NOTE ADULT - ASSESSMENT
The patient is a 49 year old female with a history of MVP who presents with DVT.    Plan:  - ECG with sinus rhythm and no evidence of ischemia/infarction  - Check echo  - CTA chest with bilateral pulmonary emboli; no evidence of right heart dilatation  - BNP and troponin levels normal  - The patient is asymptomatic from a pulmonary embolism standpoint and not hypoxic  - Discontinue heparin drip  - Start apixaban 10 mg bid for 1 week then 5 mg bid for duration of 3 months for provoked DVT  - Ambulate

## 2025-04-08 NOTE — CONSULT NOTE ADULT - ASSESSMENT
49-year-old female with no past medical historyof MVP,  presents with a DVT.  Patient reports in March she fractured her left foot.  Patient has been using a scooter at work which has caused severe left leg pain.  Patient went back to orthopedist today who did an ultrasound showing DVT. 49-year-old female with no past medical history of MVP,  presents with a DVT.  Patient reports in March she fractured her left foot.  Patient has been using a scooter at work which has caused severe left leg pain.  Patient went back to orthopedist today who did an ultrasound showing DVT.    foot fx  DVT  PE  Anxiety  MVP    AC  transition to DOAC on DC  TTE  HEME eval  pain relief  anxiolysis  I anika  CT chest and Dopplers reviewed with pt  on RA  anxious  alert  works as a HS teacher, no family hx - thrombosis  trops and proBNP normal

## 2025-04-09 VITALS
RESPIRATION RATE: 17 BRPM | HEART RATE: 71 BPM | DIASTOLIC BLOOD PRESSURE: 59 MMHG | SYSTOLIC BLOOD PRESSURE: 108 MMHG | OXYGEN SATURATION: 96 %

## 2025-04-09 LAB
ALBUMIN SERPL ELPH-MCNC: 3.4 G/DL — SIGNIFICANT CHANGE UP (ref 3.3–5)
ALP SERPL-CCNC: 58 U/L — SIGNIFICANT CHANGE UP (ref 30–120)
ALT FLD-CCNC: 15 U/L — SIGNIFICANT CHANGE UP (ref 10–60)
ANION GAP SERPL CALC-SCNC: 7 MMOL/L — SIGNIFICANT CHANGE UP (ref 5–17)
AST SERPL-CCNC: 14 U/L — SIGNIFICANT CHANGE UP (ref 10–40)
BASOPHILS # BLD AUTO: 0.03 K/UL — SIGNIFICANT CHANGE UP (ref 0–0.2)
BASOPHILS NFR BLD AUTO: 0.4 % — SIGNIFICANT CHANGE UP (ref 0–2)
BILIRUB SERPL-MCNC: 0.9 MG/DL — SIGNIFICANT CHANGE UP (ref 0.2–1.2)
BUN SERPL-MCNC: 14 MG/DL — SIGNIFICANT CHANGE UP (ref 7–23)
CALCIUM SERPL-MCNC: 8.9 MG/DL — SIGNIFICANT CHANGE UP (ref 8.4–10.5)
CHLORIDE SERPL-SCNC: 103 MMOL/L — SIGNIFICANT CHANGE UP (ref 96–108)
CO2 SERPL-SCNC: 26 MMOL/L — SIGNIFICANT CHANGE UP (ref 22–31)
CREAT SERPL-MCNC: 0.79 MG/DL — SIGNIFICANT CHANGE UP (ref 0.5–1.3)
EGFR: 92 ML/MIN/1.73M2 — SIGNIFICANT CHANGE UP
EGFR: 92 ML/MIN/1.73M2 — SIGNIFICANT CHANGE UP
EOSINOPHIL # BLD AUTO: 0.2 K/UL — SIGNIFICANT CHANGE UP (ref 0–0.5)
EOSINOPHIL NFR BLD AUTO: 2.7 % — SIGNIFICANT CHANGE UP (ref 0–6)
GLUCOSE SERPL-MCNC: 92 MG/DL — SIGNIFICANT CHANGE UP (ref 70–99)
HCT VFR BLD CALC: 36.4 % — SIGNIFICANT CHANGE UP (ref 34.5–45)
HGB BLD-MCNC: 12.2 G/DL — SIGNIFICANT CHANGE UP (ref 11.5–15.5)
IMM GRANULOCYTES NFR BLD AUTO: 0.4 % — SIGNIFICANT CHANGE UP (ref 0–0.9)
LYMPHOCYTES # BLD AUTO: 2.04 K/UL — SIGNIFICANT CHANGE UP (ref 1–3.3)
LYMPHOCYTES # BLD AUTO: 27.1 % — SIGNIFICANT CHANGE UP (ref 13–44)
MCHC RBC-ENTMCNC: 30 PG — SIGNIFICANT CHANGE UP (ref 27–34)
MCHC RBC-ENTMCNC: 33.5 G/DL — SIGNIFICANT CHANGE UP (ref 32–36)
MCV RBC AUTO: 89.4 FL — SIGNIFICANT CHANGE UP (ref 80–100)
MONOCYTES # BLD AUTO: 0.69 K/UL — SIGNIFICANT CHANGE UP (ref 0–0.9)
MONOCYTES NFR BLD AUTO: 9.2 % — SIGNIFICANT CHANGE UP (ref 2–14)
NEUTROPHILS # BLD AUTO: 4.54 K/UL — SIGNIFICANT CHANGE UP (ref 1.8–7.4)
NEUTROPHILS NFR BLD AUTO: 60.2 % — SIGNIFICANT CHANGE UP (ref 43–77)
NRBC BLD AUTO-RTO: 0 /100 WBCS — SIGNIFICANT CHANGE UP (ref 0–0)
PLATELET # BLD AUTO: 155 K/UL — SIGNIFICANT CHANGE UP (ref 150–400)
POTASSIUM SERPL-MCNC: 4.2 MMOL/L — SIGNIFICANT CHANGE UP (ref 3.5–5.3)
POTASSIUM SERPL-SCNC: 4.2 MMOL/L — SIGNIFICANT CHANGE UP (ref 3.5–5.3)
PROT SERPL-MCNC: 7 G/DL — SIGNIFICANT CHANGE UP (ref 6–8.3)
RBC # BLD: 4.07 M/UL — SIGNIFICANT CHANGE UP (ref 3.8–5.2)
RBC # FLD: 12.7 % — SIGNIFICANT CHANGE UP (ref 10.3–14.5)
SODIUM SERPL-SCNC: 136 MMOL/L — SIGNIFICANT CHANGE UP (ref 135–145)
WBC # BLD: 7.53 K/UL — SIGNIFICANT CHANGE UP (ref 3.8–10.5)
WBC # FLD AUTO: 7.53 K/UL — SIGNIFICANT CHANGE UP (ref 3.8–10.5)

## 2025-04-09 PROCEDURE — 93306 TTE W/DOPPLER COMPLETE: CPT

## 2025-04-09 PROCEDURE — 93005 ELECTROCARDIOGRAM TRACING: CPT

## 2025-04-09 PROCEDURE — 85025 COMPLETE CBC W/AUTO DIFF WBC: CPT

## 2025-04-09 PROCEDURE — 83880 ASSAY OF NATRIURETIC PEPTIDE: CPT

## 2025-04-09 PROCEDURE — 85730 THROMBOPLASTIN TIME PARTIAL: CPT

## 2025-04-09 PROCEDURE — 84702 CHORIONIC GONADOTROPIN TEST: CPT

## 2025-04-09 PROCEDURE — 36415 COLL VENOUS BLD VENIPUNCTURE: CPT

## 2025-04-09 PROCEDURE — 97161 PT EVAL LOW COMPLEX 20 MIN: CPT

## 2025-04-09 PROCEDURE — 80053 COMPREHEN METABOLIC PANEL: CPT

## 2025-04-09 PROCEDURE — 96374 THER/PROPH/DIAG INJ IV PUSH: CPT

## 2025-04-09 PROCEDURE — 84484 ASSAY OF TROPONIN QUANT: CPT

## 2025-04-09 PROCEDURE — 99285 EMERGENCY DEPT VISIT HI MDM: CPT | Mod: 25

## 2025-04-09 PROCEDURE — 71275 CT ANGIOGRAPHY CHEST: CPT | Mod: MC

## 2025-04-09 PROCEDURE — 85610 PROTHROMBIN TIME: CPT

## 2025-04-09 RX ORDER — APIXABAN 2.5 MG/1
2 TABLET, FILM COATED ORAL
Qty: 0 | Refills: 0 | DISCHARGE

## 2025-04-09 RX ORDER — APIXABAN 2.5 MG/1
1 TABLET, FILM COATED ORAL
Qty: 180 | Refills: 0
Start: 2025-04-09 | End: 2025-07-07

## 2025-04-09 RX ORDER — ACETAMINOPHEN 500 MG/5ML
2 LIQUID (ML) ORAL
Qty: 0 | Refills: 0 | DISCHARGE
Start: 2025-04-09

## 2025-04-09 RX ADMIN — APIXABAN 10 MILLIGRAM(S): 2.5 TABLET, FILM COATED ORAL at 09:58

## 2025-04-09 NOTE — CAREGIVER ENGAGEMENT NOTE - CAREGIVER EDUCATION NOTES - FREE TEXT
Per H&P: 49-year-old female with no past medical history of MVP,  presents with a DVT.  Patient reports in March she fractured her left foot.  Patient has been using a scooter at work which has caused severe left leg pain.  Patient went back to orthopedist today who did an ultrasound showing DVT.  Patient reports she had acute onset of sharp chest pain that resolved.  Patient denies fever or shortness of breath vomiting, diarrhea, IN ED had CT angio-b/l pulmonary embolism./67, HR 86, RR20, oxygenating well, Had Consult with PERT team Recommend PE treatment per [facility] protocol with heparin gtt, 2D echo         Dx Left DVT and B/L PE new to [facility] - [place] from pharmacy dept   deliver to Pt. today.   CM met with pt. at bedside with spouse present with pt. permission. CM introduced self and role and provided contact info.  Discussed transition plan for [facility] and pt. in agreement and wants to go home today.  Provided discharge notice and explained right to appeal- pt. signed and given a copy.     pt. 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 PT consult no needs.  Pt. Fx Left foot in a boot.  Pt states she can weight bear and  using a cane.  Also has a knee scooter.  States she has follow up with her ortho MD  Dr Aguayo.         CM  offered  and made MD appointments with patient :  [facility] Harsha Enriquez [phone] in [place] on  first available 4/30/25 at 2:30 pm.  Pt. asked office if she can call them to see if they have cancellations to move appt up. CM informed by pt. that Called her back and asked if we can fax over some clinicals to [phone] and CM completed the Fax and got a fax confirmation.        We Called  PCP Dr Corbin Dumont [phone] on 4/16/25 3:45 pm and staff Jackie will speak with the team to see if they can get pt. in sooner.  If so they will call Pt. directly.      CM following.

## 2025-04-09 NOTE — CAREGIVER ENGAGEMENT NOTE - CAREGIVER EDUCATION HOME CARE SERVICES - FREE TEXT
[facility] care agency [phone] will reach out to you within 24-72 hours of your discharge to schedule home care visit/eval appointment with you. Please call agency for any queries regarding home care services  Skilled Nursing requested start of care 4/10/25 sent d/c notes.  Pharmacy delivered Eliquis from [facility] pharmacy to bedside.  CM helped to make follow up appointments from bedside.

## 2025-04-09 NOTE — PHYSICAL THERAPY INITIAL EVALUATION ADULT - PERTINENT HX OF CURRENT PROBLEM, REHAB EVAL
49-year-old female with no past medical historyof MVP,  presents with a DVT.  Patient reports in March she fractured her left foot.  Patient has been using a scooter at work which has caused severe left leg pain.  Patient went back to orthopedist today who did an ultrasound showing DVT.  Patient reports she had acute onset of sharp chest pain that resolved.  Patient denies fever or shortness of breath vomiting, diarrhea, IN ED had CT angio-b/l pulmonary embolism./67, HR 86, RR20, oxygenating well, Had Consult with PERT teamRecommend PE treatment per [facility] protocol with heparin gtt, 2D echo and pulmonary medicine and hemeon consult for further evaluation. Please call PERT consultation if she clinically worsens. Pt started on Heparin and is admitted.

## 2025-04-09 NOTE — CAREGIVER ENGAGEMENT NOTE - CAREGIVER EDUCATION - TYPES DISCUSSED
Discharge plan/Home Care Services/Post-acute care agency contact/Post-discharge escalation process
Discharge plan

## 2025-04-09 NOTE — PROGRESS NOTE ADULT - ASSESSMENT
Discharge Call Back    Attempted to contact patient no answer. Message left yes .      
The patient is a 49 year old female with a history of MVP who presents with DVT.    Plan:  - ECG with sinus rhythm and no evidence of ischemia/infarction  - Echo with normal LV systolic function, normal RV function  - CTA chest with bilateral pulmonary emboli; no evidence of right heart dilatation  - BNP and troponin levels normal  - The patient is asymptomatic from a pulmonary embolism standpoint and not hypoxic  - Continue apixaban 10 mg bid for 1 week then 5 mg bid for duration of 3 months for provoked DVT  - Ambulate  - Ok for discharge from a cardiac standpoint
49-year-old female with no past medical historyof MVP,  presents with a DVT.  Patient reports in March she fractured her left foot.  Patient has been using a scooter at work which has caused severe left leg pain.  Patient went back to orthopedist today who did an ultrasound showing DVT.  Patient reports she had acute onset of sharp chest pain that resolved.  Patient denies fever or shortness of breath vomiting, diarrhea, IN ED had CT angio-b/l pulmonary embolism./67, HR 86, RR20, oxygenating well, Had Consult with PERT teamRecommend PE treatment per [facility] protocol with heparin gtt, 2D echo and pulmonary medicine and hemeon consult for further evaluation. Please call PERT consultation if she clinically worsens  Pt started on Heparin and is admitted    # B/l Pulmonary emboism  started with heparin infusion and transitioned to eliquis   cardiology consult noted   PT eval  incentive spirometry   p[facility]u;[facility]  - ECG with sinus rhythm and no evidence of ischemia/infarctionEcho with normal LV systolic function, normal RV functionNo family hx venous thromboembolismMaternal Grandmother CAD/cardiomegaly was on CoumadinNot on birth control  Former smoker age 21sprovoked left leg DVT, michael PE--recommend limited duration anticoagulation 3--6months (or longer dep on when pt returns to full baseline mobility from left foot fx healing  )ideally do not stop anticoag the first month unless emergency   vascular surgery followup(can be as outpatient)  # Left leg DVT   started on eliquis  # left foot fracture   cont care as per ortho   Pt eval  #h/o MVP  cardio consult noted      
49-year-old female with no past medical historyof MVP,  presents with a DVT.  Patient reports in March she fractured her left foot.  Patient has been using a scooter at work which has caused severe left leg pain.  Patient went back to orthopedist today who did an ultrasound showing DVT.  Patient reports she had acute onset of sharp chest pain that resolved.  Patient denies fever or shortness of breath vomiting, diarrhea, IN ED had CT angio-b/l pulmonary embolism./67, HR 86, RR20, oxygenating well, Had Consult with PERT teamRecommend PE treatment per NH protocol with heparin gtt, 2D echo and pulmonary medicine and hemeonc consult for further evaluation. Please call PERT consultation if she clinically worsens  Pt started on Heparin and is admitted    # B/l Pulmonary emboism  heparin infusion stopped   cardiology consult noted   pt started on eliquis  ambulate   PT eval  incentive spirometry   pulmonary consu;t-   Hemeonc consult  # Left leg DVT   started on eliquis  # left foot fracture   cont care as per ortho   Pt eval  #h/o MVP  cardio consult noted

## 2025-04-09 NOTE — DISCHARGE NOTE PROVIDER - PROVIDER TOKENS
PROVIDER:[TOKEN:[[id number]]] PROVIDER:[TOKEN:[[id number]]],FREE:[LAST:[your pcp, hematology and vascular],PHONE:[(   )    -],FAX:[(   )    -]]

## 2025-04-09 NOTE — PHYSICAL THERAPY INITIAL EVALUATION ADULT - RANGE OF MOTION EXAMINATION, REHAB EVAL
left ankle n/t due to fx precautions/bilateral upper extremity ROM was WNL (within normal limits)/Right LE ROM was WNL (within normal limits)

## 2025-04-09 NOTE — PROGRESS NOTE ADULT - SUBJECTIVE AND OBJECTIVE BOX
Chief Complaint: DVT    Interval Events: No events overnight.    Review of Systems:  General: No fevers, chills, weight gain  Skin: No rashes, color changes  Cardiovascular: No chest pain, orthopnea  Respiratory: No shortness of breath, cough  Gastrointestinal: No nausea, abdominal pain  Genitourinary: No incontinence, pain with urination  Musculoskeletal: No pain, swelling, decreased range of motion  Neurological: No headache, weakness  Psychiatric: No depression, anxiety  Endocrine: No weight gain, increased thirst  All other systems are comprehensively negative.    Physical Exam:  Vital Signs Last 24 Hrs  T(C): 37 (09 Apr 2025 08:04), Max: 37.3 (08 Apr 2025 20:18)  T(F): 98.6 (09 Apr 2025 08:04), Max: 99.1 (08 Apr 2025 20:18)  HR: 60 (09 Apr 2025 05:00) (56 - 76)  BP: 99/51 (09 Apr 2025 05:00) (99/51 - 116/59)  BP(mean): 65 (09 Apr 2025 05:00) (65 - 80)  RR: 15 (09 Apr 2025 05:00) (14 - 18)  SpO2: 96% (09 Apr 2025 05:00) (94% - 98%)  Parameters below as of 09 Apr 2025 05:00  Patient On (Oxygen Delivery Method): room air  General: NAD  HEENT: MMM  Neck: No JVD, no carotid bruit  Lungs: CTAB  CV: RRR, nl S1/S2, no M/R/G  Abdomen: S/NT/ND, +BS  Extremities: No LE edema, no cyanosis  Neuro: AAOx3, non-focal  Skin: No rash    Labs:                        12.2   7.53  )-----------( 155      ( 09 Apr 2025 05:45 )             36.4     04-09    136  |  103  |  14  ----------------------------<  92  4.2   |  26  |  0.79    Ca    8.9      09 Apr 2025 05:45    TPro  7.0  /  Alb  3.4  /  TBili  0.9  /  DBili  x   /  AST  14  /  ALT  15  /  AlkPhos  58  04-09        PT/INR - ( 07 Apr 2025 17:29 )   PT: 13.5 sec;   INR: 1.15 ratio         PTT - ( 08 Apr 2025 01:22 )  PTT:143.1 sec    ECG/Telemetry: Sinus rhythm
Patient is a 49y old  Female who presents with a chief complaint of dvt (08 Apr 2025 05:03)      INTERVAL HPI/OVERNIGHT EVENTS:  overnight events noted  Home Medications:      MEDICATIONS  (STANDING):  apixaban 10 milliGRAM(s) Oral every 12 hours    MEDICATIONS  (PRN):  acetaminophen     Tablet .. 650 milliGRAM(s) Oral every 6 hours PRN Temp greater or equal to 38C (100.4F), Mild Pain (1 - 3), Moderate Pain (4 - 6)      Allergies    No Known Drug Allergies  pt is allergic to eggplant and reaction she gets cyst on the face. (Other)    Intolerances        REVIEW OF SYSTEMS:  CONSTITUTIONAL: No fever, weight loss, or fatigue  EYES: No eye pain, visual disturbances, or discharge  ENMT:  No difficulty hearing, tinnitus, vertigo; No sinus or throat pain  NECK: No pain or stiffness  BREASTS: No pain, masses, or nipple discharge  RESPIRATORY: No cough, wheezing, chills or hemoptysis; No shortness of breath  CARDIOVASCULAR: No chest pain, palpitations, dizziness, or leg swelling  GASTROINTESTINAL: No abdominal or epigastric pain. No nausea, vomiting, or hematemesis; No diarrhea or constipation. No melena or hematochezia.  GENITOURINARY: No dysuria, frequency, hematuria, or incontinence  NEUROLOGICAL: No headaches, memory loss, loss of strength, numbness, or tremors  SKIN: No itching, burning, rashes, or lesions     Vital Signs Last 24 Hrs  T(C): 36.6 (08 Apr 2025 12:26), Max: 37.4 (07 Apr 2025 22:29)  T(F): 97.9 (08 Apr 2025 12:26), Max: 99.3 (07 Apr 2025 22:29)  HR: 76 (08 Apr 2025 12:00) (65 - 86)  BP: 116/59 (08 Apr 2025 12:00) (106/64 - 125/67)  BP(mean): 76 (08 Apr 2025 12:00) (76 - 88)  RR: 14 (08 Apr 2025 12:00) (14 - 20)  SpO2: 97% (08 Apr 2025 12:00) (93% - 99%)    Parameters below as of 08 Apr 2025 12:00  Patient On (Oxygen Delivery Method): room air        PHYSICAL EXAM:  GENERAL: NAD, well-groomed, well-developed  HEAD:  Atraumatic, Normocephalic  EYES: EOMI, PERRLA, conjunctiva and sclera clear  ENMT: Moist mucous membranes,   NECK: Supple, No JVD, Normal thyroid  NERVOUS SYSTEM:  Alert & Oriented X3, non focal  CHEST/LUNG: Clear to percussion bilaterally;   HEART: Regular rate and rhythm; No murmurs, rubs, or gallops  ABDOMEN: Soft, Nontender, Nondistended; Bowel sounds present  EXTREMITIES:  2+ Peripheral Pulses, No clubbing, cyanosis, or edema  SKIN: No rashes or lesions    LABS:                        12.0   8.31  )-----------( 135      ( 08 Apr 2025 06:00 )             35.3     04-08    137  |  101  |  9   ----------------------------<  106[H]  4.1   |  29  |  0.65    Ca    8.9      08 Apr 2025 06:00    TPro  7.0  /  Alb  3.6  /  TBili  1.1  /  DBili  x   /  AST  16  /  ALT  17  /  AlkPhos  53  04-08    PT/INR - ( 07 Apr 2025 17:29 )   PT: 13.5 sec;   INR: 1.15 ratio         PTT - ( 08 Apr 2025 01:22 )  PTT:143.1 sec  Urinalysis Basic - ( 08 Apr 2025 06:00 )    Color: x / Appearance: x / SG: x / pH: x  Gluc: 106 mg/dL / Ketone: x  / Bili: x / Urobili: x   Blood: x / Protein: x / Nitrite: x   Leuk Esterase: x / RBC: x / WBC x   Sq Epi: x / Non Sq Epi: x / Bacteria: x      CAPILLARY BLOOD GLUCOSE              I&O's Summary    07 Apr 2025 07:01  -  08 Apr 2025 07:00  --------------------------------------------------------  IN: 98 mL / OUT: 0 mL / NET: 98 mL        RADIOLOGY & ADDITIONAL TESTS:    Imaging Personally Reviewed:  [x ] YES  [ ] NO    Consultant(s) Notes Reviewed:  [ x] YES  [ ] NO    Care Discussed with Consultants/Other Providers [ x] YES  [ ] NO
Patient is a 49y old  Female who presents with a chief complaint of dvt (09 Apr 2025 09:37)      INTERVAL HPI/OVERNIGHT EVENTS:overnight events noted    Home Medications:  acetaminophen 325 mg oral tablet: 2 tab(s) orally every 6 hours As needed Temp greater or equal to 38C (100.4F), Mild Pain (1 - 3), Moderate Pain (4 - 6) (09 Apr 2025 10:11)      MEDICATIONS  (STANDING):  apixaban 10 milliGRAM(s) Oral every 12 hours    MEDICATIONS  (PRN):  acetaminophen     Tablet .. 650 milliGRAM(s) Oral every 6 hours PRN Temp greater or equal to 38C (100.4F), Mild Pain (1 - 3), Moderate Pain (4 - 6)      Allergies    No Known Drug Allergies  pt is allergic to eggplant and reaction she gets cyst on the face. (Other)    Intolerances        REVIEW OF SYSTEMS:  CONSTITUTIONAL: No fever, weight loss, or fatigue  EYES: No eye pain, visual disturbances, or discharge  ENMT:  No difficulty hearing, tinnitus, vertigo; No sinus or throat pain  NECK: No pain or stiffness  BREASTS: No pain, masses, or nipple discharge  RESPIRATORY: No cough, wheezing, chills or hemoptysis; No shortness of breath  CARDIOVASCULAR: No chest pain, palpitations, dizziness, or leg swelling  GASTROINTESTINAL: No abdominal or epigastric pain. No nausea, vomiting, or hematemesis; No diarrhea or constipation. No melena or hematochezia.  GENITOURINARY: No dysuria, frequency, hematuria, or incontinence  NEUROLOGICAL: No headaches, memory loss, loss of strength, numbness, or tremors  SKIN: No itching, burning, rashes, or lesions   MUSCULOSKELETAL: left leg pain  Vital Signs Last 24 Hrs  T(C): 37 (09 Apr 2025 08:04), Max: 37.3 (08 Apr 2025 20:18)  T(F): 98.6 (09 Apr 2025 08:04), Max: 99.1 (08 Apr 2025 20:18)  HR: 60 (09 Apr 2025 05:00) (56 - 76)  BP: 99/51 (09 Apr 2025 05:00) (99/51 - 116/59)  BP(mean): 65 (09 Apr 2025 05:00) (65 - 80)  RR: 15 (09 Apr 2025 05:00) (14 - 18)  SpO2: 96% (09 Apr 2025 05:00) (94% - 98%)    Parameters below as of 09 Apr 2025 05:00  Patient On (Oxygen Delivery Method): room air        PHYSICAL EXAM:  GENERAL: NAD, well-groomed, well-developed  HEAD:  Atraumatic, Normocephalic  EYES: EOMI, PERRLA, conjunctiva and sclera clear  ENMT: Moist mucous membranes,   NECK: Supple, No JVD, Normal thyroid  NERVOUS SYSTEM:  Alert & Oriented X3, non focal  CHEST/LUNG: Clear to percussion bilaterally;   HEART: Regular rate and rhythm;   ABDOMEN: Soft, Nontender, Nondistended; Bowel sounds present  EXTREMITIES: left leg edema,   SKIN: No rashes or lesions    LABS:                        12.2   7.53  )-----------( 155      ( 09 Apr 2025 05:45 )             36.4     04-09    136  |  103  |  14  ----------------------------<  92  4.2   |  26  |  0.79    Ca    8.9      09 Apr 2025 05:45    TPro  7.0  /  Alb  3.4  /  TBili  0.9  /  DBili  x   /  AST  14  /  ALT  15  /  AlkPhos  58  04-09    PT/INR - ( 07 Apr 2025 17:29 )   PT: 13.5 sec;   INR: 1.15 ratio         PTT - ( 08 Apr 2025 01:22 )  PTT:143.1 sec  Urinalysis Basic - ( 09 Apr 2025 05:45 )    Color: x / Appearance: x / SG: x / pH: x  Gluc: 92 mg/dL / Ketone: x  / Bili: x / Urobili: x   Blood: x / Protein: x / Nitrite: x   Leuk Esterase: x / RBC: x / WBC x   Sq Epi: x / Non Sq Epi: x / Bacteria: x      CAPILLARY BLOOD GLUCOSE              I&O's Summary      RADIOLOGY & ADDITIONAL TESTS:    Imaging Personally Reviewed:  [ x] YES  [ ] NO    Consultant(s) Notes Reviewed:  [ x] YES  [ ] NO    Care Discussed with Consultants/Other Providers [x ] YES  [ ] NO

## 2025-04-09 NOTE — DISCHARGE NOTE PROVIDER - HOSPITAL COURSE
49-year-old female with no past medical historyof MVP,  presents with a DVT.  Patient reports in March she fractured her left foot.  Patient has been using a scooter at work which has caused severe left leg pain.  Patient went back to orthopedist today who did an ultrasound showing DVT.  Patient reports she had acute onset of sharp chest pain that resolved.  Patient denies fever or shortness of breath vomiting, diarrhea, IN ED had CT angio-b/l pulmonary embolism./67, HR 86, RR20, oxygenating well, Had Consult with PERT teamRecommend PE treatment per NH protocol with heparin gtt, 2D echo and pulmonary medicine and hemeon consult for further evaluation. Please call PERT consultation if she clinically worsens  Pt started on Heparin and is admitted    # B/l Pulmonary emboism  started with heparin infusion and transitioned to eliquis   cardiology consult noted   PT eval  incentive spirometry   p[facility] consu;[facility]  - ECG with sinus rhythm and no evidence of ischemia/infarctionEcho with normal LV systolic function, normal RV functionNo family hx venous thromboembolismMaternal Grandmother CAD/cardiomegaly was on CoumadinNot on birth control  Former smoker age 21sprovoked left leg DVT, michael PE--recommend limited duration anticoagulation 3--6months (or longer dep on when pt returns to full baseline mobility from left foot fx healing  )ideally do not stop anticoag the first month unless emergency   vascular surgery followup(can be as outpatient)  # Left leg DVT   started on eliquis  # left foot fracture   cont care as per ortho   Pt eval  #h/o MVP  cardio consult noted

## 2025-04-09 NOTE — DISCHARGE NOTE PROVIDER - NSDCCPCAREPLAN_GEN_ALL_CORE_FT
PRINCIPAL DISCHARGE DIAGNOSIS  Diagnosis: Pulmonary embolism  Assessment and Plan of Treatment: eliquis 10 mg bid x 7 days   5 mgbid x 3 months

## 2025-04-09 NOTE — DISCHARGE NOTE PROVIDER - CARE PROVIDER_API CALL
Juliocesar Walters  Cardiology  [address], [address]  [place] [address]  Phone: [phone]  Fax: [phone]  Follow Up Time:    Juliocesar Walters  Cardiology  [address], [address]  [place] [address]  Phone: [phone]  Fax: [phone]  Follow Up Time:     your pcp, hematology and vascular,   Phone: (   )    -  Fax: (   )    -  Follow Up Time:

## 2025-04-09 NOTE — PHARMACOTHERAPY INTERVENTION NOTE - COMMENTS
Spoke to Dr Juliocesar Douglas confirmed when heparin drip discontinued. He wants Eliquis to start an hour after d/c heparin drip. Heparin drip  dcd 9 am start eliquis at 10 am.
Meds to beds requested by provider.    The following prescriptions were filled at [facility]: Eliquis starter pack      Medications delivered by pharmacist at bedside and counseled on indication, directions, and side effects.  Patient verbalized understanding and had no further questions

## 2025-04-09 NOTE — PHYSICAL THERAPY INITIAL EVALUATION ADULT - ADDITIONAL COMMENTS
Lives in house with spouse and children.  No MARSHAL; 6-7 stairs to kitchen with HR; 6-7 to bedrooms with HR. Owns cane and knee scooter.

## 2025-04-09 NOTE — DISCHARGE NOTE PROVIDER - NSDCMRMEDTOKEN_GEN_ALL_CORE_FT
cyclobenzaprine 10 mg oral tablet: 1 tab(s) orally every 8 hours as needed for  muscle spasm DO NOT DRIVE OR OPERATE MACHINERY WHILE TAKING THIS MEDICATION DUE TO IT&#x27;S SEDATIVE SIDE EFFECTS  Eliquis Starter Pack for Treatment of DVT and PE 5 mg oral tablet: 1 tab(s) orally 2 times a day 2 Tablets twice a day for seven days followed by 1 tablet twice a day afterwards   acetaminophen 325 mg oral tablet: 2 tab(s) orally every 6 hours As needed Temp greater or equal to 38C (100.4F), Mild Pain (1 - 3), Moderate Pain (4 - 6)  cyclobenzaprine 10 mg oral tablet: 1 tab(s) orally every 8 hours as needed for  muscle spasm DO NOT DRIVE OR OPERATE MACHINERY WHILE TAKING THIS MEDICATION DUE TO IT&#x27;S SEDATIVE SIDE EFFECTS  Eliquis 5 mg oral tablet: 1 tab(s) orally 2 times a day

## 2025-05-28 PROBLEM — I82.409 ACUTE EMBOLISM AND THROMBOSIS OF UNSPECIFIED DEEP VEINS OF UNSPECIFIED LOWER EXTREMITY: Chronic | Status: ACTIVE | Noted: 2025-04-07

## 2025-06-02 ENCOUNTER — OUTPATIENT (OUTPATIENT)
Dept: OUTPATIENT SERVICES | Facility: HOSPITAL | Age: 50
LOS: 1 days | End: 2025-06-02
Payer: COMMERCIAL

## 2025-06-02 ENCOUNTER — APPOINTMENT (OUTPATIENT)
Dept: CARDIOTHORACIC SURGERY | Facility: CLINIC | Age: 50
End: 2025-06-02
Payer: COMMERCIAL

## 2025-06-02 ENCOUNTER — TRANSCRIPTION ENCOUNTER (OUTPATIENT)
Age: 50
End: 2025-06-02

## 2025-06-02 ENCOUNTER — RESULT REVIEW (OUTPATIENT)
Age: 50
End: 2025-06-02

## 2025-06-02 ENCOUNTER — NON-APPOINTMENT (OUTPATIENT)
Age: 50
End: 2025-06-02

## 2025-06-02 VITALS
RESPIRATION RATE: 14 BRPM | SYSTOLIC BLOOD PRESSURE: 122 MMHG | HEIGHT: 67 IN | OXYGEN SATURATION: 99 % | DIASTOLIC BLOOD PRESSURE: 82 MMHG | HEART RATE: 70 BPM | BODY MASS INDEX: 24.33 KG/M2 | WEIGHT: 155 LBS

## 2025-06-02 DIAGNOSIS — Z79.01 LONG TERM (CURRENT) USE OF ANTICOAGULANTS: ICD-10-CM

## 2025-06-02 DIAGNOSIS — R00.2 PALPITATIONS: ICD-10-CM

## 2025-06-02 DIAGNOSIS — I82.432 ACUTE EMBOLISM AND THROMBOSIS OF LEFT POPLITEAL VEIN: ICD-10-CM

## 2025-06-02 DIAGNOSIS — R07.89 OTHER CHEST PAIN: ICD-10-CM

## 2025-06-02 DIAGNOSIS — R06.00 DYSPNEA, UNSPECIFIED: ICD-10-CM

## 2025-06-02 DIAGNOSIS — I26.94 MULT SUBSEGMENTAL THROM PULM EMBOLI W/O ACUTE COR PULMONALE: ICD-10-CM

## 2025-06-02 LAB
ALBUMIN SERPL ELPH-MCNC: 4.6 G/DL
ALP BLD-CCNC: 59 U/L
ALT SERPL-CCNC: 13 U/L
ANION GAP SERPL CALC-SCNC: 14 MMOL/L
AST SERPL-CCNC: 15 U/L
BILIRUB SERPL-MCNC: 0.8 MG/DL
BUN SERPL-MCNC: 10 MG/DL
CALCIUM SERPL-MCNC: 9.4 MG/DL
CHLORIDE SERPL-SCNC: 104 MMOL/L
CO2 SERPL-SCNC: 19 MMOL/L
CREAT SERPL-MCNC: 0.78 MG/DL
EGFRCR SERPLBLD CKD-EPI 2021: 93 ML/MIN/1.73M2
FERRITIN SERPL-MCNC: 19 NG/ML
GLUCOSE SERPL-MCNC: 93 MG/DL
HCT VFR BLD CALC: 38.5 %
HGB BLD-MCNC: 12.8 G/DL
IRON SATN MFR SERPL: 25 %
IRON SERPL-MCNC: 92 UG/DL
MCHC RBC-ENTMCNC: 29.7 PG
MCHC RBC-ENTMCNC: 33.2 G/DL
MCV RBC AUTO: 89.3 FL
NT-PROBNP SERPL-MCNC: 124 PG/ML
PLATELET # BLD AUTO: 191 K/UL
POTASSIUM SERPL-SCNC: 4.5 MMOL/L
PROT SERPL-MCNC: 7.4 G/DL
RBC # BLD: 4.31 M/UL
RBC # FLD: 12.9 %
SODIUM SERPL-SCNC: 137 MMOL/L
TIBC SERPL-MCNC: 371 UG/DL
TRANSFERRIN SERPL-MCNC: 304 MG/DL
TROPONIN-T, HIGH SENSITIVITY: <6 NG/L
UIBC SERPL-MCNC: 279 UG/DL
WBC # FLD AUTO: 6.7 K/UL

## 2025-06-02 PROCEDURE — 93000 ELECTROCARDIOGRAM COMPLETE: CPT

## 2025-06-02 PROCEDURE — 93970 EXTREMITY STUDY: CPT

## 2025-06-02 PROCEDURE — 99205 OFFICE O/P NEW HI 60 MIN: CPT

## 2025-06-02 PROCEDURE — 93970 EXTREMITY STUDY: CPT | Mod: 26

## 2025-06-02 PROCEDURE — G2211 COMPLEX E/M VISIT ADD ON: CPT | Mod: NC

## 2025-06-02 RX ORDER — FAMOTIDINE 20 MG/1
20 TABLET, FILM COATED ORAL TWICE DAILY
Refills: 0 | Status: ACTIVE | COMMUNITY
Start: 2025-06-02

## 2025-06-02 RX ORDER — APIXABAN 5 MG/1
5 TABLET, FILM COATED ORAL
Qty: 180 | Refills: 1 | Status: ACTIVE | COMMUNITY
Start: 2025-06-02

## 2025-06-03 LAB — DEPRECATED D DIMER PPP IA-ACNC: <150 NG/ML DDU

## 2025-07-30 ENCOUNTER — OFFICE (OUTPATIENT)
Dept: URBAN - METROPOLITAN AREA CLINIC 70 | Facility: CLINIC | Age: 50
Setting detail: OPHTHALMOLOGY
End: 2025-07-30
Payer: COMMERCIAL

## 2025-07-30 DIAGNOSIS — H01.001: ICD-10-CM

## 2025-07-30 DIAGNOSIS — H01.004: ICD-10-CM

## 2025-07-30 PROCEDURE — 99213 OFFICE O/P EST LOW 20 MIN: CPT | Performed by: OPHTHALMOLOGY

## 2025-07-30 ASSESSMENT — VISUAL ACUITY
OD_BCVA: 20/20-
OS_BCVA: 20/20

## 2025-07-30 ASSESSMENT — REFRACTION_AUTOREFRACTION
OS_CYLINDER: -0.50
OD_SPHERE: -5.00
OD_AXIS: 173
OS_SPHERE: -4.75
OS_AXIS: 174
OD_CYLINDER: -0.50

## 2025-07-30 ASSESSMENT — CONFRONTATIONAL VISUAL FIELD TEST (CVF)
OD_FINDINGS: FULL
OS_FINDINGS: FULL

## 2025-07-30 ASSESSMENT — KERATOMETRY
OD_K2POWER_DIOPTERS: 45.00
OS_K2POWER_DIOPTERS: 44.75
OS_AXISANGLE_DEGREES: 090
OD_K1POWER_DIOPTERS: 44.75
OD_AXISANGLE_DEGREES: 046
OS_K1POWER_DIOPTERS: 44.75

## 2025-07-30 ASSESSMENT — LID EXAM ASSESSMENTS
OS_COMMENTS: PRESENT COLLARETTES +DEMODEX
OD_COMMENTS: PRESENT COLLARETTES +DEMODEX
OS_BLEPHARITIS: LUL 2+
OD_BLEPHARITIS: RUL 2+

## 2025-08-06 ENCOUNTER — APPOINTMENT (OUTPATIENT)
Dept: NEUROLOGY | Facility: CLINIC | Age: 50
End: 2025-08-06